# Patient Record
Sex: FEMALE | Race: WHITE | NOT HISPANIC OR LATINO | Employment: FULL TIME | URBAN - METROPOLITAN AREA
[De-identification: names, ages, dates, MRNs, and addresses within clinical notes are randomized per-mention and may not be internally consistent; named-entity substitution may affect disease eponyms.]

---

## 2017-01-10 ENCOUNTER — TELEPHONE (OUTPATIENT)
Dept: FAMILY MEDICINE | Facility: CLINIC | Age: 32
End: 2017-01-10

## 2017-01-10 NOTE — TELEPHONE ENCOUNTER
----- Message from Mariella Weber sent at 1/10/2017  8:48 AM CST -----  Contact: Self/[phone]   Patient is calling to speak with someone in the office regarding a personal and feminine issue. Please call and advise.    Thank you!

## 2017-01-26 ENCOUNTER — TELEPHONE (OUTPATIENT)
Dept: FAMILY MEDICINE | Facility: CLINIC | Age: 32
End: 2017-01-26

## 2017-01-26 NOTE — TELEPHONE ENCOUNTER
----- Message from Nimo Arreaga sent at 1/26/2017 12:09 PM CST -----  Contact: call  [phone]  Pt is calling to discuss a personal matter with doctor molly

## 2017-01-26 NOTE — TELEPHONE ENCOUNTER
Spoke with patient reports the following symptoms vaginal itching/discharge, foul vaginal odor, patient states the last time this happen which was in June Dr. Mendez ordered MetroGel.  Patient would like this medication sent to her pharmacy.  Declined offered appt.  Please advise.

## 2017-07-19 ENCOUNTER — HOSPITAL ENCOUNTER (OUTPATIENT)
Dept: RADIOLOGY | Facility: HOSPITAL | Age: 32
Discharge: HOME | End: 2017-07-19
Attending: FAMILY MEDICINE
Payer: COMMERCIAL

## 2017-07-19 ENCOUNTER — OFFICE VISIT (OUTPATIENT)
Dept: FAMILY MEDICINE | Facility: CLINIC | Age: 32
End: 2017-07-19
Payer: COMMERCIAL

## 2017-07-19 VITALS
WEIGHT: 112.44 LBS | DIASTOLIC BLOOD PRESSURE: 68 MMHG | HEIGHT: 64 IN | BODY MASS INDEX: 19.2 KG/M2 | TEMPERATURE: 99 F | SYSTOLIC BLOOD PRESSURE: 100 MMHG

## 2017-07-19 DIAGNOSIS — B96.89 BV (BACTERIAL VAGINOSIS): ICD-10-CM

## 2017-07-19 DIAGNOSIS — N76.0 VAGINOSIS: ICD-10-CM

## 2017-07-19 DIAGNOSIS — R22.9 LUMPS ON THE SKIN: ICD-10-CM

## 2017-07-19 DIAGNOSIS — T17.908A FOREIGN BODY ASPIRATION, INITIAL ENCOUNTER: ICD-10-CM

## 2017-07-19 DIAGNOSIS — N63.0 BREAST MASS IN FEMALE: ICD-10-CM

## 2017-07-19 DIAGNOSIS — T17.908A FOREIGN BODY ASPIRATION, INITIAL ENCOUNTER: Primary | ICD-10-CM

## 2017-07-19 DIAGNOSIS — N89.8 VAGINAL IRRITATION: ICD-10-CM

## 2017-07-19 DIAGNOSIS — N76.0 BV (BACTERIAL VAGINOSIS): ICD-10-CM

## 2017-07-19 DIAGNOSIS — L84 PRE-ULCERATIVE CORN OR CALLOUS: ICD-10-CM

## 2017-07-19 PROCEDURE — 73630 X-RAY EXAM OF FOOT: CPT | Mod: 26,RT,, | Performed by: RADIOLOGY

## 2017-07-19 PROCEDURE — 99999 PR PBB SHADOW E&M-EST. PATIENT-LVL III: CPT | Mod: PBBFAC,,, | Performed by: FAMILY MEDICINE

## 2017-07-19 PROCEDURE — 99214 OFFICE O/P EST MOD 30 MIN: CPT | Mod: 25,S$GLB,, | Performed by: FAMILY MEDICINE

## 2017-07-19 PROCEDURE — 28190 REMOVAL OF FOOT FOREIGN BODY: CPT | Mod: RT,S$GLB,, | Performed by: FAMILY MEDICINE

## 2017-07-19 PROCEDURE — 73630 X-RAY EXAM OF FOOT: CPT | Mod: TC,PO,RT

## 2017-07-19 RX ORDER — METRONIDAZOLE 7.5 MG/G
1 GEL VAGINAL 2 TIMES DAILY
Qty: 140 G | Refills: 3 | Status: SHIPPED | OUTPATIENT
Start: 2017-07-19 | End: 2017-11-22 | Stop reason: SDUPTHER

## 2017-07-19 NOTE — PROGRESS NOTES
Cass Merlos is a 31 y.o. female   She will complaining of a mass in the right breast, possible foreign body in the bottom of the foot, and recurrent bacterial vaginosis  Source of history: Patient  Past Medical History:   Diagnosis Date    Abnormal Pap smear     Allergy     Anxiety     GERD (gastroesophageal reflux disease)     Hemorrhoids without complication     Supraventricular tachycardia      Patient  reports that she has been smoking.  She has a 3.00 pack-year smoking history. She has never used smokeless tobacco. She reports that she drinks about 1.2 oz of alcohol per week . She reports that she does not use drugs.  Family History   Problem Relation Age of Onset    Arrhythmia Mother     Breast cancer Neg Hx     Cancer Neg Hx     Colon cancer Neg Hx     Diabetes Neg Hx     Eclampsia Neg Hx     Hypertension Neg Hx     Miscarriages / Stillbirths Neg Hx     Ovarian cancer Neg Hx      labor Neg Hx     Stroke Neg Hx      ROS:  GENERAL: No fever, chills, fatigability or weight loss.  SKIN: No rashes, itching or changes in color or texture of skin.  HEAD: No headaches or recent head trauma.  EYES: Visual acuity fine. No photophobia, ocular pain or diplopia.  EARS: Denies ear pain, discharge or vertigo.  NOSE: No loss of smell, no epistaxis or postnasal drip.  MOUTH & THROAT: No hoarseness or change in voice. No excessive gum bleeding.  NODES: Denies swollen glands.  CHEST: Denies GTZ, cyanosis, wheezing, cough and sputum production.  CARDIOVASCULAR: Denies chest pain, PND, orthopnea or reduced exercise tolerance.  ABDOMEN: Appetite fine. No weight loss. Denies diarrhea, abdominal pain, hematemesis or blood in stool.  URINARY:  complaining of foul-smelling vaginal discharge periodically /patient not sexually active presently   PERIPHERAL VASCULAR: No claudication or cyanosis.  MUSCULOSKELETAL: Foot pain possible foreign body  NEUROLOGIC: No history of seizures, paralysis, alteration  of gait or coordination.    OBJECTIVE:  APPEARANCE: Normal appearance  Vitals:    07/19/17 1057   BP: 100/68   Temp: 99.4 °F (37.4 °C)     SKIN: Normal skin turgor, no lesions.  Few benign appearing lesions on the upper back   HEENT: Both external auditory canals clear. Both tympanic membranes intact. PERRL. EOMI.   NECK: No bruits. No cervical spine tenderness. No cervical lymphadenopathy. No thyromegaly.  NODES: No cervical, axillary or inguinal lymph node enlargement.  CHEST: Breath sounds clear bilaterally. Lungs clear to auscultation & percussion. Good air movement. No rales. No retractions. No rhonchi. No stridor. No wheezes.  CARDIOVASCULAR: Normal S1, S2. No murmurs. No edema.  BREASTS: Patient has bilateral saline implants, on palpation of both breasts is a small hardened area lateral to the right breast implant  The opposite side is unremarkable and there are no skin changes over either breast area.  No masses palpated in either axilla  ABDOMEN: Bowel sounds normal. No palpable aortic enlargement. No CVA tenderness. No pulsatile mass. No rebound tenderness.  PERIPHERAL VASCULAR: Femoral pulses present and symmetrical. No edema.  MUSCULOSKELETAL: Hard and painful callus on the plantar surface of the right foot  BACK: No CVA tenderness. There is no spasm, tenderness or radiculopathy noted with palpation and there is full range of motion.   NEUROLOGIC:   Cranial Nerves: II-XII grossly intact.  Motor: 5/5 strength major flexors/extensors. No tremor.  DTR's: Knees, Ankles 2+ and equal bilaterally; downgoing toes.  Sensory: Intact to light touch distally.  Gait & Posture: Normal gait and fine motion. No cerebellar signs.  MENTAL STATUS: Alert. Oriented x 3. Language skills normal.  Well kept appearance.    ASSESSMENT/PLAN:   Cass was seen today for foot injury and foot pain.    Diagnoses and all orders for this visit:    Foreign body  initial encounter  -     X-Ray Foot Complete Right; Future  No foreign  bodies were identified on the film results discussed with the patient  Vaginosis  -     metronidazole (METROGEL) 0.75 % vaginal gel; Place 1 applicator vaginally 2 (two) times daily.  Painful callus on the plantar surface of the foot  -     Patient's permission the area cleaned and pared to patient's comfort  Patient tolerated procedure without any difficulty there was no blood loss  Patient had remarkable relief of discomfort after paring of the callus.  Lumps on the breast  -     US Breast Right Limited; Future  We will contact patient with results of testing are available

## 2017-10-24 ENCOUNTER — TELEPHONE (OUTPATIENT)
Dept: FAMILY MEDICINE | Facility: CLINIC | Age: 32
End: 2017-10-24

## 2017-10-24 NOTE — TELEPHONE ENCOUNTER
----- Message from Yanna Ferreira sent at 10/24/2017 11:07 AM CDT -----  Contact: self/ [phone]  Please call patient about her 7/19/17 visit. Billing issue.

## 2017-10-24 NOTE — TELEPHONE ENCOUNTER
Spoke with patient states she came in on 7/19/17 with possible glass noted to her foot.  Patient states she had x-ray done which showed no glass but Dr. Mendez did remove/shave some skin off the bottom of her foot that did relieve the pain she had.  Patient states she received a call from her insurance company stating that the procedure that you performed on her foot was denied and they advised her that it was a cosmetic issue and not medically related patient would like you to reword the procedure that was done and resubmit it to her insurance since they are charging her $300

## 2017-10-26 NOTE — TELEPHONE ENCOUNTER
Attempted to contact patient voicemail left advising her that the office visit was recoded and to contact our billing department (phone number provided) in a few days concerning the claim

## 2017-11-22 DIAGNOSIS — N76.0 VAGINOSIS: ICD-10-CM

## 2017-11-22 DIAGNOSIS — N89.8 VAGINAL IRRITATION: ICD-10-CM

## 2017-11-22 RX ORDER — METRONIDAZOLE 7.5 MG/G
GEL VAGINAL
Qty: 140 G | Refills: 2 | Status: SHIPPED | OUTPATIENT
Start: 2017-11-22 | End: 2020-05-05

## 2019-12-31 ENCOUNTER — TELEPHONE (OUTPATIENT)
Dept: OBSTETRICS AND GYNECOLOGY | Facility: CLINIC | Age: 34
End: 2019-12-31

## 2019-12-31 ENCOUNTER — TELEPHONE (OUTPATIENT)
Dept: PRIMARY CARE CLINIC | Facility: CLINIC | Age: 34
End: 2019-12-31

## 2019-12-31 ENCOUNTER — OFFICE VISIT (OUTPATIENT)
Dept: PRIMARY CARE CLINIC | Facility: CLINIC | Age: 34
End: 2019-12-31
Payer: COMMERCIAL

## 2019-12-31 ENCOUNTER — HOSPITAL ENCOUNTER (OUTPATIENT)
Dept: RADIOLOGY | Facility: OTHER | Age: 34
Discharge: HOME | End: 2019-12-31
Attending: FAMILY MEDICINE
Payer: COMMERCIAL

## 2019-12-31 VITALS
BODY MASS INDEX: 18.93 KG/M2 | HEART RATE: 104 BPM | HEIGHT: 64 IN | DIASTOLIC BLOOD PRESSURE: 80 MMHG | WEIGHT: 110.88 LBS | TEMPERATURE: 99 F | SYSTOLIC BLOOD PRESSURE: 106 MMHG

## 2019-12-31 DIAGNOSIS — Z34.90 PREGNANCY, UNSPECIFIED GESTATIONAL AGE: ICD-10-CM

## 2019-12-31 DIAGNOSIS — N92.6 MENSTRUAL DISORDER: Primary | ICD-10-CM

## 2019-12-31 LAB
B-HCG UR QL: POSITIVE
CTP QC/QA: YES

## 2019-12-31 PROCEDURE — 76817 TRANSVAGINAL US OBSTETRIC: CPT | Mod: 26,,, | Performed by: RADIOLOGY

## 2019-12-31 PROCEDURE — 76801 OB US < 14 WKS SINGLE FETUS: CPT | Mod: TC

## 2019-12-31 PROCEDURE — 76801 OB US < 14 WKS SINGLE FETUS: CPT | Mod: 26,,, | Performed by: RADIOLOGY

## 2019-12-31 PROCEDURE — 3008F BODY MASS INDEX DOCD: CPT | Mod: CPTII,S$GLB,, | Performed by: FAMILY MEDICINE

## 2019-12-31 PROCEDURE — 76801 US OB LESS THAN 14 WKS WITH TRANSVAGINAL (XPD): ICD-10-PCS | Mod: 26,,, | Performed by: RADIOLOGY

## 2019-12-31 PROCEDURE — 99999 PR PBB SHADOW E&M-EST. PATIENT-LVL IV: ICD-10-PCS | Mod: PBBFAC,,, | Performed by: FAMILY MEDICINE

## 2019-12-31 PROCEDURE — 81025 URINE PREGNANCY TEST: CPT | Mod: S$GLB,,, | Performed by: FAMILY MEDICINE

## 2019-12-31 PROCEDURE — 81025 POCT URINE PREGNANCY: ICD-10-PCS | Mod: S$GLB,,, | Performed by: FAMILY MEDICINE

## 2019-12-31 PROCEDURE — 76817 US OB LESS THAN 14 WKS WITH TRANSVAGINAL (XPD): ICD-10-PCS | Mod: 26,,, | Performed by: RADIOLOGY

## 2019-12-31 PROCEDURE — 99214 PR OFFICE/OUTPT VISIT, EST, LEVL IV, 30-39 MIN: ICD-10-PCS | Mod: S$GLB,,, | Performed by: FAMILY MEDICINE

## 2019-12-31 PROCEDURE — 3008F PR BODY MASS INDEX (BMI) DOCUMENTED: ICD-10-PCS | Mod: CPTII,S$GLB,, | Performed by: FAMILY MEDICINE

## 2019-12-31 PROCEDURE — 99214 OFFICE O/P EST MOD 30 MIN: CPT | Mod: S$GLB,,, | Performed by: FAMILY MEDICINE

## 2019-12-31 PROCEDURE — 99999 PR PBB SHADOW E&M-EST. PATIENT-LVL IV: CPT | Mod: PBBFAC,,, | Performed by: FAMILY MEDICINE

## 2019-12-31 RX ORDER — SWAB
1 SWAB, NON-MEDICATED MISCELLANEOUS DAILY
Qty: 30 TABLET | Refills: 12 | COMMUNITY
Start: 2019-12-31 | End: 2020-12-17

## 2019-12-31 NOTE — TELEPHONE ENCOUNTER
----- Message from Gisela Babin sent at 12/31/2019  3:03 PM CST -----  Contact: [phone]  Patient would like a call from nurse regarding scheduling a sooner appointment. Please call patient.

## 2019-12-31 NOTE — TELEPHONE ENCOUNTER
----- Message from Rosie Oconnell sent at 12/31/2019  3:39 PM CST -----  Contact: Self Call  [phone]  Please call her in ref to the Us she is to take

## 2019-12-31 NOTE — TELEPHONE ENCOUNTER
----- Message from Margarita Harkins sent at 12/31/2019  4:37 PM CST -----  Contact: self   Patient called to see if the ultra sound came back ok with the pregnancy. Please call and advise.

## 2019-12-31 NOTE — TELEPHONE ENCOUNTER
Discussed with Dr. Oates.  Pt advised to keep today's appt to confirm pregnancy.  Pt requesting ultrasound to rule out tubal pregnancy.

## 2019-12-31 NOTE — TELEPHONE ENCOUNTER
Patient informed pending u/s results,  Will know when to  schedule appointment, will contact Thursday to set up appointment.

## 2019-12-31 NOTE — TELEPHONE ENCOUNTER
----- Message from Ricardo Maguire sent at 12/31/2019  8:00 AM CST -----  Contact: Patient [phone]  Patient would like to get medical advice.    Comments: Patient stating would like for the pcp to call back this morning if possible. Has an appt today at 1:40 , think is pregnant.    Please call an advise  Thank you

## 2019-12-31 NOTE — TELEPHONE ENCOUNTER
Pt advised US results not yet received.  Pt now active [facility].  Msg will be sent as soon as results rec'd.

## 2019-12-31 NOTE — PATIENT INSTRUCTIONS
Pregnancy After Age 35  Its a myth that being 35 or older means your pregnancy will be high risk. Making the right choices now and working with your healthcare provider can help make your pregnancy trouble-free.  Things to think about  Most women who are 35 or older have normal pregnancies, but there are some things to think about before getting pregnant. Once a woman reaches 35, she has a greater chance for:  · Problems getting pregnant  · Miscarriage  · Stillbirth  · Diabetes or high blood pressure while pregnant  · Being tired all the time when pregnant  ·  section (surgery to deliver a baby)  · Having babies with genetic problems, like Down syndrome  · Being pregnant with 2 or more babies  Making the right choices  Before and after you become pregnant:  · Dont use recreational drugs.  · Dont drink alcohol.  · Dont smoke.  Keeping you and your baby healthy  Before and during your pregnancy:  · Take a daily vitamin supplement that contains folic acid and iron.  · Eat a high-fiber, low-fat diet rich in fruits and vegetables.  · Stay physically active.  · Keep a healthy weight.  · Avoid contact with harmful substances in your home or workplace.  You may need extra care if you have any of the following:  · Sexually transmitted diseases (STDs)  · Diabetes  · High blood pressure  · Other chronic health problems  Special health care  Fertility counseling. As women age, getting pregnant can get more difficult. Ask your healthcare provider how long you should try to get pregnant before seeking help from a specialist.  Genetic counseling. Genetic counseling evaluates the risk for birth defects in your baby. You will be asked detailed questions about your family health history. You may also have medical tests.  Amniocentesis. This test studies amniotic fluid (liquid that surrounds the fetus in the womb). It can help diagnose birth defects and other medical problems.   Date Last Reviewed: 2015  © 2669-6905  [employer]. [address] [place] [address], [place] [address]. All rights reserved. This information is not intended as a substitute for professional medical care. Always follow your healthcare professional's instructions.        Exercise During Pregnancy  Regular exercise can help you adapt to the changes your body is going through during pregnancy. Exercising may help you relax, and it gets you ready for labor and delivery. Talk to your healthcare provider about the kinds of activities you can do. Then go ahead and enjoy them.    Get started  Even if you didnt exercise before pregnancy, it is not too late to start. Choose an activity that you like and that fits your lifestyle. Begin slowly and build up a little at a time. Be sure to check with your healthcare provider before starting any exercise program. The following tips may help you get started:  · Choose a time and place to exercise each day.  · Wear loose-fitting clothes and comfortable athletic shoes.  · Stretch before and after you exercise. (Be sure to stretch slowly and to hold stretches for 30 to 40 seconds.)  Be active  Unless your healthcare provider says otherwise, try to exercise for 30 minutes or more most days of the week:  · Overall conditioning, like swimming, bicycling, or walking, is especially beneficial.  · Aerobics and exercises that increase your pulse rate help condition your body and strengthen your heart. Ask about special prenatal aerobics classes.  Exercise safely  These tips will help you have a safe, healthy workout:  · Stay cool. Stop exercising if you feel overheated.  · Slow down if youre out of breath. If you cant talk during exercise, lower the intensity of the workout.  · Monitor the intensity of your workout. Only do moderate-intensity (not strenuous) exercise.  · Stay off your back. Lying on your back can decrease blood flow to your baby.  · Drink water before, during and after your workout.  · Eat 300 extra  calories a day. A light snack before and after you exercise will help keep your energy up.  · Avoid activities requiring balancing skills later in pregnancy.  Do Kegel exercises  Kegel exercises strengthen the pelvic floor muscles used in childbirth. These muscles are the same ones used to stop the flow of urine. Do Kegel exercises daily:  · Squeeze your pelvic floor muscles for a count of 3.  · Relax, then squeeze again.  · Repeat 10-15 times in a row at least 3 times a day.  · You can do Kegel exercises anytime and anywhere.  Keep walking  No matter what other exercise you do, try to walk whenever you can:  · If youre working all day, take a lunchtime walk in the park with a friend.  · When you shop, park away from the store entrance and walk the extra distance.  · Take the stairs instead of the elevator.     When to stop exercising and call your healthcare provider  Call your healthcare provider right away if you have:  · Shortness of breath before starting exercise  · Vaginal bleeding  · Dizziness or feeling faint  · Chest pain  · Headache  · Decreased fetal movement  ·  contractions   · Muscle weakness  · Calf pain or swelling  · Fluid leaking from the vagina   Date Last Reviewed: 2015  © 3014-1106 [employer]. [address] [place] [address], [place] [address]. All rights reserved. This information is not intended as a substitute for professional medical care. Always follow your healthcare professional's instructions.        Healthy Eating Habits During Pregnancy    Its important to develop healthy eating habits while you are pregnant, for you as well as for your baby. Here are some ways to stay healthy.  Aim for a healthy weight  A slow, steady rate of weight gain is often best. After the first trimester, you may gain about a pound a week. If you were overweight before pregnancy, you need to gain fewer pounds. Your healthcare provider can give you a healthy weight goal for your  pregnancy.  Dont diet  Now is not the time to diet. You may not get enough of the nutrients you and your baby need. Instead, learn how to be a healthy eater. Start by doing it for your baby. Soon, you may do it for yourself.  Vitamins and supplements  Talk with your healthcare provider about taking these and other prenatal vitamins and supplements.  · Iron makes the extra blood you need now.  · Calcium and vitamin D help build and keep strong bones.  · Folic acid helps prevent certain birth defects.  · Some vitamins may not be safe to take. Your healthcare provider will tell you which ones to avoid.  Fluids  Drink at least 8 to 10 cups of fluid daily. Your baby needs fluids. Fluids also decrease constipation, flush out toxins and waste, limit swelling, and help prevent bladder infections. Water is best. Other good choices are:  · Water or seltzer water with a slice of lemon or lime. (These can also help ease an upset stomach.)  · Clear soups that are low in salt  · Low-fat or fat-free milk; soy or rice milk with calcium added  · 100% fruit juices mixed with water  · Popsicles or gelatin  Things to avoid  Some things might harm your growing baby. Dont eat or drink:  · Alcohol  · Unpasteurized dairy foods and juices  · Raw or undercooked meat, poultry, fish, or eggs  · Prepared meats, like deli meats or hot dogs, unless heated until steaming hot  · Fish that are high in mercury, like shark, swordfish, gregory mackerel, tilefish, and albacore tuna   Things to limit  Ask your healthcare provider whether its safe to eat or drink:  · Caffeine  · Artificial sweeteners  · Organ meats  · Certain types of fish  · Fish and shellfish that contain mercury in lower amounts, like shrimp, canned light tuna, salmon, pollock, and catfish   Date Last Reviewed: 8/16/2015  © 4049-4684 [employer]. [address] [place] [address], [place] [address]. All rights reserved. This information is not intended as a substitute for  professional medical care. Always follow your healthcare professional's instructions.

## 2019-12-31 NOTE — TELEPHONE ENCOUNTER
----- Message from Gisela Babin sent at 12/31/2019  3:03 PM CST -----  Contact: [phone]  Patient would like a call from nurse regarding scheduling a sooner appointment. Please call patient.

## 2020-01-01 ENCOUNTER — HOSPITAL ENCOUNTER (EMERGENCY)
Facility: OTHER | Age: 35
Discharge: HOME | End: 2020-01-01
Attending: EMERGENCY MEDICINE
Payer: COMMERCIAL

## 2020-01-01 VITALS
SYSTOLIC BLOOD PRESSURE: 105 MMHG | OXYGEN SATURATION: 100 % | HEART RATE: 92 BPM | RESPIRATION RATE: 18 BRPM | HEIGHT: 64 IN | DIASTOLIC BLOOD PRESSURE: 62 MMHG | BODY MASS INDEX: 18.78 KG/M2 | WEIGHT: 110 LBS

## 2020-01-01 DIAGNOSIS — O36.80X0 PREGNANCY OF UNKNOWN ANATOMIC LOCATION: Primary | ICD-10-CM

## 2020-01-01 DIAGNOSIS — N83.9 LESION OF OVARY: ICD-10-CM

## 2020-01-01 LAB
ALBUMIN SERPL BCP-MCNC: 3.9 G/DL (ref 3.5–5.2)
ALP SERPL-CCNC: 63 U/L (ref 55–135)
ALT SERPL W/O P-5'-P-CCNC: 15 U/L (ref 10–44)
ANION GAP SERPL CALC-SCNC: 9 MMOL/L (ref 8–16)
AST SERPL-CCNC: 13 U/L (ref 10–40)
BACTERIA #/AREA URNS HPF: ABNORMAL /HPF
BASOPHILS # BLD AUTO: 0.04 K/UL (ref 0–0.2)
BASOPHILS NFR BLD: 0.5 % (ref 0–1.9)
BILIRUB SERPL-MCNC: 0.2 MG/DL (ref 0.1–1)
BILIRUB UR QL STRIP: NEGATIVE
BUN SERPL-MCNC: 11 MG/DL (ref 6–20)
CALCIUM SERPL-MCNC: 9.5 MG/DL (ref 8.7–10.5)
CHLORIDE SERPL-SCNC: 109 MMOL/L (ref 95–110)
CLARITY UR: ABNORMAL
CO2 SERPL-SCNC: 22 MMOL/L (ref 23–29)
COLOR UR: YELLOW
CREAT SERPL-MCNC: 0.7 MG/DL (ref 0.5–1.4)
DIFFERENTIAL METHOD: ABNORMAL
EOSINOPHIL # BLD AUTO: 0.1 K/UL (ref 0–0.5)
EOSINOPHIL NFR BLD: 1.6 % (ref 0–8)
ERYTHROCYTE [DISTWIDTH] IN BLOOD BY AUTOMATED COUNT: 12.5 % (ref 11.5–14.5)
EST. GFR  (AFRICAN AMERICAN): >60 ML/MIN/1.73 M^2
EST. GFR  (NON AFRICAN AMERICAN): >60 ML/MIN/1.73 M^2
GLUCOSE SERPL-MCNC: 83 MG/DL (ref 70–110)
GLUCOSE UR QL STRIP: NEGATIVE
HCG INTACT+B SERPL-ACNC: 31 MIU/ML
HCT VFR BLD AUTO: 41.6 % (ref 37–48.5)
HGB BLD-MCNC: 13.8 G/DL (ref 12–16)
HGB UR QL STRIP: ABNORMAL
IMM GRANULOCYTES # BLD AUTO: 0.02 K/UL (ref 0–0.04)
IMM GRANULOCYTES NFR BLD AUTO: 0.3 % (ref 0–0.5)
KETONES UR QL STRIP: NEGATIVE
LEUKOCYTE ESTERASE UR QL STRIP: NEGATIVE
LYMPHOCYTES # BLD AUTO: 2.1 K/UL (ref 1–4.8)
LYMPHOCYTES NFR BLD: 28.4 % (ref 18–48)
MCH RBC QN AUTO: 31.8 PG (ref 27–31)
MCHC RBC AUTO-ENTMCNC: 33.2 G/DL (ref 32–36)
MCV RBC AUTO: 96 FL (ref 82–98)
MICROSCOPIC COMMENT: ABNORMAL
MONOCYTES # BLD AUTO: 0.7 K/UL (ref 0.3–1)
MONOCYTES NFR BLD: 9.4 % (ref 4–15)
NEUTROPHILS # BLD AUTO: 4.5 K/UL (ref 1.8–7.7)
NEUTROPHILS NFR BLD: 59.8 % (ref 38–73)
NITRITE UR QL STRIP: NEGATIVE
NRBC BLD-RTO: 0 /100 WBC
PH UR STRIP: 6 [PH] (ref 5–8)
PLATELET # BLD AUTO: 337 K/UL (ref 150–350)
PMV BLD AUTO: 9.2 FL (ref 9.2–12.9)
POTASSIUM SERPL-SCNC: 4.3 MMOL/L (ref 3.5–5.1)
PROT SERPL-MCNC: 7.3 G/DL (ref 6–8.4)
PROT UR QL STRIP: NEGATIVE
RBC # BLD AUTO: 4.34 M/UL (ref 4–5.4)
RBC #/AREA URNS HPF: 8 /HPF (ref 0–4)
SODIUM SERPL-SCNC: 140 MMOL/L (ref 136–145)
SP GR UR STRIP: >=1.03 (ref 1–1.03)
SQUAMOUS #/AREA URNS HPF: 11 /HPF
URN SPEC COLLECT METH UR: ABNORMAL
UROBILINOGEN UR STRIP-ACNC: NEGATIVE EU/DL
WBC # BLD AUTO: 7.54 K/UL (ref 3.9–12.7)

## 2020-01-01 PROCEDURE — 84702 CHORIONIC GONADOTROPIN TEST: CPT

## 2020-01-01 PROCEDURE — 99283 EMERGENCY DEPT VISIT LOW MDM: CPT | Mod: 25

## 2020-01-01 PROCEDURE — 80053 COMPREHEN METABOLIC PANEL: CPT

## 2020-01-01 PROCEDURE — 81000 URINALYSIS NONAUTO W/SCOPE: CPT

## 2020-01-01 PROCEDURE — 36000 PLACE NEEDLE IN VEIN: CPT

## 2020-01-01 PROCEDURE — 85025 COMPLETE CBC W/AUTO DIFF WBC: CPT

## 2020-01-01 NOTE — ED TRIAGE NOTES
Pt reports positive home pregnancy test, verified w/ PCP, had ultrasound done last night. Received phone call telling patient to come to ED for further evaluation of ultrasound. Pt deniesa ny pain, denies vaginal discharge, is AAO x 3, answers questions appropriately

## 2020-01-01 NOTE — PROGRESS NOTES
Subjective:       Patient ID: Cass Merlos is a 34 y.o. female.    Chief Complaint: home pregnancy testing showed positive reading   Patient denies use of any birth control she is not in any pain  He has not had a menstrual period for 3 months now  HPI see above  Review of Systems   Constitutional: Negative.    HENT: Negative.    Eyes: Negative.    Respiratory: Negative.    Endocrine: Negative.    Genitourinary:        A menorrhea for 3 months   Musculoskeletal: Negative.    Skin: Negative.    Allergic/Immunologic: Negative.    Neurological: Negative.    Hematological: Negative.    Psychiatric/Behavioral: Negative.        Objective:      Physical Exam   Constitutional: She is oriented to person, place, and time. She appears well-developed and well-nourished. No distress.   HENT:   Head: Normocephalic and atraumatic.   Right Ear: External ear normal.   Left Ear: External ear normal.   Nose: Nose normal.   Mouth/Throat: Oropharynx is clear and moist.   Eyes: Pupils are equal, round, and reactive to light. Conjunctivae and EOM are normal.   Neck: Normal range of motion. Neck supple. No JVD present.   Cardiovascular: Normal rate and regular rhythm.   Pulmonary/Chest: Effort normal and breath sounds normal.   Abdominal: Soft. Bowel sounds are normal.   Musculoskeletal: Normal range of motion.   Neurological: She is alert and oriented to person, place, and time. She displays normal reflexes. No cranial nerve deficit or sensory deficit. She exhibits normal muscle tone. Coordination normal.   Skin: Skin is warm and dry. Capillary refill takes less than 2 seconds. No rash noted. She is not diaphoretic. No erythema. No pallor.   Psychiatric: She has a normal mood and affect. Her behavior is normal. Judgment and thought content normal.   Nursing note and vitals reviewed.      Assessment:       1. Menstrual disorder    2. Pregnancy, unspecified gestational age        Plan:     Cass was seen today for home pregnancy  testing showed positive reading.    Diagnoses and all orders for this visit:    Menstrual disorder  -     POCT URINE PREGNANCY-very weakly positive x2  -     hCG, quantitative; Future  -     CBC auto differential; Future  -     TSH; Future  -     Comprehensive metabolic panel; Future  -     Lipid panel; Future  -     Follicle stimulating hormone; Future  -     US Pelvis Limited Non OB; Future    Pregnancy, unspecified gestational age  -     US OB Less Than 14 Wks Add Gestation; patient is scheduled for today  -     prenatal vit-iron fum-folic ac (PRENATAL TABLET) 28 mg iron- 800 mcg Tab; Take 1 tablet by mouth once daily.  -     Ambulatory consult to Obstetrics / Gynecology  Will contact the patient with results of testing when available

## 2020-01-01 NOTE — ED PROVIDER NOTES
Encounter Date: 2020    SCRIBE #1 NOTE: I, Shoshana Fernandes, am scribing for, and in the presence of,  Dr. Flannery. I have scribed the following portions of the note - Other sections scribed: ED Course updates.       History     Chief Complaint   Patient presents with    abnormal ultrasound     pt had u/s yesterday and refered here.      Patient is a 34-year-old female who presents to the emergency room with complaint of being sent to the emergency department for pregnancy with recent abnormal ultrasound.  Patient denies any nausea, vomiting, fever, chills, shortness of breath, abdominal pain or pelvic cramping.  No vaginal bleeding.  There are no other complaints.        Review of patient's allergies indicates:   Allergen Reactions    Vicodin [hydrocodone-acetaminophen]      ITCHING     Past Medical History:   Diagnosis Date    Abnormal Pap smear     Allergy     Anxiety     GERD (gastroesophageal reflux disease)     Hemorrhoids without complication     Supraventricular tachycardia      Past Surgical History:   Procedure Laterality Date    CERVICAL BIOPSY  W/ LOOP ELECTRODE EXCISION      VAGINAL DELIVERY       Family History   Problem Relation Age of Onset    Arrhythmia Mother     Breast cancer Neg Hx     Cancer Neg Hx     Colon cancer Neg Hx     Diabetes Neg Hx     Eclampsia Neg Hx     Hypertension Neg Hx     Miscarriages / Stillbirths Neg Hx     Ovarian cancer Neg Hx      labor Neg Hx     Stroke Neg Hx      Social History     Tobacco Use    Smoking status: Current Some Day Smoker     Packs/day: 0.50     Years: 6.00     Pack years: 3.00    Smokeless tobacco: Never Used   Substance Use Topics    Alcohol use: Yes     Alcohol/week: 2.0 standard drinks     Types: 2 Glasses of wine per week    Drug use: No     Review of Systems   Constitutional: Negative for fever.   HENT: Negative for sore throat.    Respiratory: Negative for shortness of breath.    Cardiovascular: Negative for chest  pain.   Gastrointestinal: Negative for abdominal pain and nausea.   Genitourinary: Negative for dysuria, flank pain, frequency, hematuria, vaginal bleeding and vaginal discharge.   Musculoskeletal: Negative for back pain.   Skin: Negative for rash.   Neurological: Negative for weakness.   Hematological: Does not bruise/bleed easily.       Physical Exam     Initial Vitals [01/01/20 1205]   BP Pulse Resp Temp SpO2   108/70 89 18 -- 100 %      MAP       --         Physical Exam    Nursing note and vitals reviewed.  Constitutional: She appears well-developed and well-nourished. She is not diaphoretic. No distress.   HENT:   Head: Normocephalic and atraumatic.   Mouth/Throat: Oropharynx is clear and moist and mucous membranes are normal.   Mucous membranes are moist. TMs clear and intact bilaterally.    Eyes: Conjunctivae and EOM are normal. Pupils are equal, round, and reactive to light. No scleral icterus.   Conjunctivae are pink, clear, and intact.    Neck: Normal range of motion. Neck supple.   Cardiovascular: Normal rate, regular rhythm, S1 normal, S2 normal and normal heart sounds. Exam reveals no gallop and no friction rub.    No murmur heard.  Pulmonary/Chest: Breath sounds normal. No respiratory distress. She has no wheezes. She has no rhonchi. She has no rales.   Lungs clear to auscultation bilaterally.    Abdominal: Soft. Bowel sounds are normal. There is no tenderness. There is no rebound and no guarding.   No audible bruits.    Musculoskeletal: Normal range of motion. She exhibits no edema or tenderness.   No lower extremity edema.    Lymphadenopathy:     She has no cervical adenopathy.   Neurological: She is alert and oriented to person, place, and time.   Skin: Skin is warm and dry. Capillary refill takes less than 2 seconds. No rash noted. No pallor.   No skin tenting. No lesions.   Psychiatric: She has a normal mood and affect. Her behavior is normal. Judgment and thought content normal.         ED  Course   Procedures  Labs Reviewed   CBC W/ AUTO DIFFERENTIAL - Abnormal; Notable for the following components:       Result Value    Mean Corpuscular Hemoglobin 31.8 (*)     All other components within normal limits   COMPREHENSIVE METABOLIC PANEL - Abnormal; Notable for the following components:    CO2 22 (*)     All other components within normal limits   URINALYSIS, REFLEX TO URINE CULTURE - Abnormal; Notable for the following components:    Appearance, UA Hazy (*)     Specific Gravity, UA >=1.030 (*)     Occult Blood UA 2+ (*)     All other components within normal limits    Narrative:     Preferred Collection Type->Urine, Clean Catch   URINALYSIS MICROSCOPIC - Abnormal; Notable for the following components:    RBC, UA 8 (*)     All other components within normal limits    Narrative:     Preferred Collection Type->Urine, Clean Catch   HCG, QUANTITATIVE, PREGNANCY          Imaging Results    None          Medical Decision Making:   History:   Old Medical Records: I decided to obtain old medical records.  Clinical Tests:   Lab Tests: Ordered and Reviewed            Scribe Attestation:   Scribe #1: I performed the above scribed service and the documentation accurately describes the services I performed. I attest to the accuracy of the note.    Attending Attestation:           Physician Attestation for Scribe:  Physician Attestation Statement for Scribe #1: I, Dr. Flannery, reviewed documentation, as scribed by Shoshana Fernandes in my presence, and it is both accurate and complete.         Attending ED Notes:   Emergent evaluation a 34-year-old female, G2, P1 without prenatal care presents to the emergency room with abnormal ultrasound that was suspicious for possible ectopic pregnancy.  Patient is afebrile, nontoxic appearing with stable vital signs.  Patient has no elevation white blood cell count.  H&H within normal limits. No acute findings on CMP.  Beta HCG is 31.  No acute findings in urinary analysis.  Ultrasound  was reviewed by Radiology, and OB.  Dr. Zuniga came to the patient's bedside for evaluation.  The patient is cleared for discharge. The patient is extensively counseled her diagnosis and treatment including all laboratory and physical exam findings.  The patient is discharged in good condition and directed to follow up with OB this coming Monday.          ED Course as of Jan 01 1848 Wed Jan 01, 2020   1256 Paged GYN    [LT]   1336 Discussed case with GYN services who will review the imaging and call back.    [LT]   1349 Patient tearful, requesting to speak with GYN services. GYN notified. Dr. Zuniga of GYN, will come to assess the patient.    [LT]      ED Course User Index  [LT] Shoshana Fernandes                Clinical Impression:     1. Pregnancy of unknown anatomic location    2. Lesion of ovary                              Chong Do MD  01/01/20 1848

## 2020-01-02 ENCOUNTER — TELEPHONE (OUTPATIENT)
Dept: OBSTETRICS AND GYNECOLOGY | Facility: CLINIC | Age: 35
End: 2020-01-02

## 2020-01-02 ENCOUNTER — DOCUMENTATION ONLY (OUTPATIENT)
Dept: PRIMARY CARE CLINIC | Facility: CLINIC | Age: 35
End: 2020-01-02

## 2020-01-02 NOTE — TELEPHONE ENCOUNTER
Dr. Roche has reviewed patients u/s and lab, pt to be contacted to ensure repeat HCG 1/3/2020. Lm on vm to cb

## 2020-01-02 NOTE — PROGRESS NOTES
Patient Review of Clinical Information     Problems   This clinical information was not verified, but there are updates pending review:   No Longer Applicable Problems Start Date Reported By  Comments   Exposure to STD 4/23/2015 Cass Merlos    Vaginosis 7/19/2017 Cass Merlos    Vaginitis 10/29/2014 Cass Merlos    Medications   This clinical information was not verified.   Allergies   This clinical information was not verified.

## 2020-01-03 ENCOUNTER — TELEPHONE (OUTPATIENT)
Dept: PRIMARY CARE CLINIC | Facility: CLINIC | Age: 35
End: 2020-01-03

## 2020-01-03 ENCOUNTER — LAB VISIT (OUTPATIENT)
Dept: LAB | Facility: OTHER | Age: 35
End: 2020-01-03
Attending: STUDENT IN AN ORGANIZED HEALTH CARE EDUCATION/TRAINING PROGRAM
Payer: COMMERCIAL

## 2020-01-03 DIAGNOSIS — O36.80X0 PREGNANCY OF UNKNOWN ANATOMIC LOCATION: ICD-10-CM

## 2020-01-03 DIAGNOSIS — N92.6 MENSTRUAL DISORDER: ICD-10-CM

## 2020-01-03 LAB
ALBUMIN SERPL BCP-MCNC: 4.2 G/DL (ref 3.5–5.2)
ALP SERPL-CCNC: 60 U/L (ref 55–135)
ALT SERPL W/O P-5'-P-CCNC: 14 U/L (ref 10–44)
ANION GAP SERPL CALC-SCNC: 9 MMOL/L (ref 8–16)
AST SERPL-CCNC: 14 U/L (ref 10–40)
BASOPHILS # BLD AUTO: 0.06 K/UL (ref 0–0.2)
BASOPHILS NFR BLD: 0.7 % (ref 0–1.9)
BILIRUB SERPL-MCNC: 0.2 MG/DL (ref 0.1–1)
BUN SERPL-MCNC: 9 MG/DL (ref 6–20)
CALCIUM SERPL-MCNC: 9.9 MG/DL (ref 8.7–10.5)
CHLORIDE SERPL-SCNC: 106 MMOL/L (ref 95–110)
CHOLEST SERPL-MCNC: 173 MG/DL (ref 120–199)
CHOLEST/HDLC SERPL: 2.6 {RATIO} (ref 2–5)
CO2 SERPL-SCNC: 24 MMOL/L (ref 23–29)
CREAT SERPL-MCNC: 0.7 MG/DL (ref 0.5–1.4)
DIFFERENTIAL METHOD: ABNORMAL
EOSINOPHIL # BLD AUTO: 0.1 K/UL (ref 0–0.5)
EOSINOPHIL NFR BLD: 1.4 % (ref 0–8)
ERYTHROCYTE [DISTWIDTH] IN BLOOD BY AUTOMATED COUNT: 12.6 % (ref 11.5–14.5)
EST. GFR  (AFRICAN AMERICAN): >60 ML/MIN/1.73 M^2
EST. GFR  (NON AFRICAN AMERICAN): >60 ML/MIN/1.73 M^2
FSH SERPL-ACNC: 1.4 MIU/ML
GLUCOSE SERPL-MCNC: 76 MG/DL (ref 70–110)
HCG INTACT+B SERPL-ACNC: 58 MIU/ML
HCT VFR BLD AUTO: 44.3 % (ref 37–48.5)
HDLC SERPL-MCNC: 66 MG/DL (ref 40–75)
HDLC SERPL: 38.2 % (ref 20–50)
HGB BLD-MCNC: 14.3 G/DL (ref 12–16)
IMM GRANULOCYTES # BLD AUTO: 0.01 K/UL (ref 0–0.04)
IMM GRANULOCYTES NFR BLD AUTO: 0.1 % (ref 0–0.5)
LDLC SERPL CALC-MCNC: 95.6 MG/DL (ref 63–159)
LYMPHOCYTES # BLD AUTO: 2.5 K/UL (ref 1–4.8)
LYMPHOCYTES NFR BLD: 30.3 % (ref 18–48)
MCH RBC QN AUTO: 31.5 PG (ref 27–31)
MCHC RBC AUTO-ENTMCNC: 32.3 G/DL (ref 32–36)
MCV RBC AUTO: 98 FL (ref 82–98)
MONOCYTES # BLD AUTO: 0.7 K/UL (ref 0.3–1)
MONOCYTES NFR BLD: 8.2 % (ref 4–15)
NEUTROPHILS # BLD AUTO: 4.9 K/UL (ref 1.8–7.7)
NEUTROPHILS NFR BLD: 59.3 % (ref 38–73)
NONHDLC SERPL-MCNC: 107 MG/DL
NRBC BLD-RTO: 0 /100 WBC
PLATELET # BLD AUTO: 365 K/UL (ref 150–350)
PMV BLD AUTO: 9.9 FL (ref 9.2–12.9)
POTASSIUM SERPL-SCNC: 4 MMOL/L (ref 3.5–5.1)
PROT SERPL-MCNC: 7.8 G/DL (ref 6–8.4)
RBC # BLD AUTO: 4.54 M/UL (ref 4–5.4)
SODIUM SERPL-SCNC: 139 MMOL/L (ref 136–145)
TRIGL SERPL-MCNC: 57 MG/DL (ref 30–150)
TSH SERPL DL<=0.005 MIU/L-ACNC: 1.2 UIU/ML (ref 0.4–4)
WBC # BLD AUTO: 8.28 K/UL (ref 3.9–12.7)

## 2020-01-03 PROCEDURE — 85025 COMPLETE CBC W/AUTO DIFF WBC: CPT

## 2020-01-03 PROCEDURE — 80053 COMPREHEN METABOLIC PANEL: CPT

## 2020-01-03 PROCEDURE — 36415 COLL VENOUS BLD VENIPUNCTURE: CPT

## 2020-01-03 PROCEDURE — 84702 CHORIONIC GONADOTROPIN TEST: CPT

## 2020-01-03 PROCEDURE — 80061 LIPID PANEL: CPT

## 2020-01-03 PROCEDURE — 83001 ASSAY OF GONADOTROPIN (FSH): CPT

## 2020-01-03 PROCEDURE — 84443 ASSAY THYROID STIM HORMONE: CPT

## 2020-01-03 NOTE — TELEPHONE ENCOUNTER
SPOKE TO PATIENT REGARDING HER ER VISIT SHE SPOKE TO GYNECOLOGY WHO DID NOT THINK THAT SHE HAD A TUBAL PREGNANCY AND ORDERED A QUANTITATIVE BETA HCG FOR 2 DAYS RESULTS COMPARED TO 2 DAYS AGO SHOWED A VALUE DOUBLING FROM 31-58.  PATIENT NOT EXPERIENCING BLEEDING OR PAIN AT THIS POINT WILL REPEAT BETA HCG IN 2 WEEKS PATIENT ADVISED THAT IF SHE EXPERIENCES PAIN OF BLEEDING SHE SHOULD GO IMMEDIATELY TO THE EMERGENCY ROOM FOR EVALUATION

## 2020-01-03 NOTE — TELEPHONE ENCOUNTER
Please see message and advise     ----- Message from Tracie Luna sent at 1/3/2020  4:28 PM CST -----  Contact: [phone]  Patient is requesting a call from the office in regards to lab results concerning her pregnancy .  Please advise, thanks

## 2020-01-07 ENCOUNTER — TELEPHONE (OUTPATIENT)
Dept: OBSTETRICS AND GYNECOLOGY | Facility: CLINIC | Age: 35
End: 2020-01-07

## 2020-01-07 ENCOUNTER — LAB VISIT (OUTPATIENT)
Dept: LAB | Facility: HOSPITAL | Age: 35
End: 2020-01-07
Attending: OBSTETRICS & GYNECOLOGY
Payer: COMMERCIAL

## 2020-01-07 ENCOUNTER — PATIENT MESSAGE (OUTPATIENT)
Dept: OBSTETRICS AND GYNECOLOGY | Facility: CLINIC | Age: 35
End: 2020-01-07

## 2020-01-07 DIAGNOSIS — O20.0 THREATENED ABORTION IN EARLY PREGNANCY: ICD-10-CM

## 2020-01-07 DIAGNOSIS — O20.0 THREATENED ABORTION IN EARLY PREGNANCY: Primary | ICD-10-CM

## 2020-01-07 LAB
HCG INTACT+B SERPL-ACNC: 167 MIU/ML
PROGEST SERPL-MCNC: 6.9 NG/ML

## 2020-01-07 PROCEDURE — 36415 COLL VENOUS BLD VENIPUNCTURE: CPT

## 2020-01-07 PROCEDURE — 84144 ASSAY OF PROGESTERONE: CPT

## 2020-01-07 PROCEDURE — 84702 CHORIONIC GONADOTROPIN TEST: CPT

## 2020-01-07 NOTE — TELEPHONE ENCOUNTER
----- Message from Jordan Armenta, Patient Care Assistant sent at 1/7/2020  4:34 PM CST -----  Contact: DEON HARRIS [[id number]]  Name of Who is Calling: DEON HARRIS [[id number]]    What is the request in detail:Requesting a call back in regards of results.  Please contact to further discuss and advise      Can the clinic reply by [facility]: No    What [facility]:  [phone]

## 2020-01-07 NOTE — TELEPHONE ENCOUNTER
Patient called to see if her results were ready. Patient advised that her labs were still in process. Patient is requesting a call as soon as the results are posted.

## 2020-01-07 NOTE — TELEPHONE ENCOUNTER
HCG 1/3/2020 51, per Dr. Roche repeat today. Pending results today will move forward with recommendation of repeat lab or ultrasound.

## 2020-01-07 NOTE — TELEPHONE ENCOUNTER
Pt informed still in process, pending lab results will be in touch regarding recommendations of ultrasound versus repeat labs.

## 2020-01-07 NOTE — TELEPHONE ENCOUNTER
Patient reports vaginal bleeding spotting with restroom use, report cramping. Informed per Dr. Roche can take Tylenol for discomforts, report to ED for pelvic pain not resolved with Tylenol or more to one side. Patient to report to ED if bleeding/spotting increases filling pad with in hour. Also patient has blood type a negative recorded in chart.

## 2020-01-08 ENCOUNTER — TELEPHONE (OUTPATIENT)
Dept: OBSTETRICS AND GYNECOLOGY | Facility: CLINIC | Age: 35
End: 2020-01-08

## 2020-01-08 ENCOUNTER — TELEPHONE (OUTPATIENT)
Dept: MATERNAL FETAL MEDICINE | Facility: CLINIC | Age: 35
End: 2020-01-08

## 2020-01-08 ENCOUNTER — PATIENT MESSAGE (OUTPATIENT)
Dept: OBSTETRICS AND GYNECOLOGY | Facility: CLINIC | Age: 35
End: 2020-01-08

## 2020-01-08 ENCOUNTER — CLINICAL SUPPORT (OUTPATIENT)
Dept: OBSTETRICS AND GYNECOLOGY | Facility: CLINIC | Age: 35
End: 2020-01-08
Payer: COMMERCIAL

## 2020-01-08 ENCOUNTER — LAB VISIT (OUTPATIENT)
Dept: LAB | Facility: OTHER | Age: 35
End: 2020-01-08
Attending: OBSTETRICS & GYNECOLOGY
Payer: COMMERCIAL

## 2020-01-08 DIAGNOSIS — O20.0 THREATENED ABORTION IN EARLY PREGNANCY: ICD-10-CM

## 2020-01-08 DIAGNOSIS — Z29.13 ENCOUNTER FOR PROPHYLACTIC ADMINISTRATION OF RHOGAM: ICD-10-CM

## 2020-01-08 DIAGNOSIS — O20.0 THREATENED ABORTION IN EARLY PREGNANCY: Primary | ICD-10-CM

## 2020-01-08 LAB
ABO + RH BLD: NORMAL
BLD GP AB SCN CELLS X3 SERPL QL: NORMAL

## 2020-01-08 PROCEDURE — 99999 PR PBB SHADOW E&M-EST. PATIENT-LVL I: ICD-10-PCS | Mod: PBBFAC,,,

## 2020-01-08 PROCEDURE — 99999 PR PBB SHADOW E&M-EST. PATIENT-LVL I: CPT | Mod: PBBFAC,,,

## 2020-01-08 PROCEDURE — 96372 RHO (D) IMMUNE GLOBULIN: ICD-10-PCS | Mod: S$GLB,,, | Performed by: OBSTETRICS & GYNECOLOGY

## 2020-01-08 PROCEDURE — 36415 COLL VENOUS BLD VENIPUNCTURE: CPT

## 2020-01-08 PROCEDURE — 86850 RBC ANTIBODY SCREEN: CPT

## 2020-01-08 PROCEDURE — 96372 THER/PROPH/DIAG INJ SC/IM: CPT | Mod: S$GLB,,, | Performed by: OBSTETRICS & GYNECOLOGY

## 2020-01-08 NOTE — TELEPHONE ENCOUNTER
Patient was notified of ultrasound on Monday 1/13/20 at 1pm in MFM clinic on 4th floor of hospital at [facility].    Pt verbalized understanding of information.

## 2020-01-08 NOTE — PROGRESS NOTES
Here for rhogam injection, no complaints at this time. Verified patient is RH negative. No complaint of pain before or after injection. Patient advised to wait 15 minutes before leaving and report any adverse reactions.    Site:  RB

## 2020-01-08 NOTE — TELEPHONE ENCOUNTER
Dr. Roche reviewed levels, pt to have type and screen then rhogam today. Repeat hcg and ultrasound Monday. Patient reports brownish spotting no cramping or pain. Patient given ER and Ectopic instructions patient verbalized understanding.

## 2020-01-09 ENCOUNTER — PATIENT MESSAGE (OUTPATIENT)
Dept: OBSTETRICS AND GYNECOLOGY | Facility: CLINIC | Age: 35
End: 2020-01-09

## 2020-01-09 ENCOUNTER — TELEPHONE (OUTPATIENT)
Dept: OBSTETRICS AND GYNECOLOGY | Facility: CLINIC | Age: 35
End: 2020-01-09

## 2020-01-09 DIAGNOSIS — O20.0 THREATENED ABORTION: Primary | ICD-10-CM

## 2020-01-10 ENCOUNTER — PATIENT MESSAGE (OUTPATIENT)
Dept: OBSTETRICS AND GYNECOLOGY | Facility: CLINIC | Age: 35
End: 2020-01-10

## 2020-01-10 ENCOUNTER — TELEPHONE (OUTPATIENT)
Dept: OBSTETRICS AND GYNECOLOGY | Facility: CLINIC | Age: 35
End: 2020-01-10

## 2020-01-10 ENCOUNTER — LAB VISIT (OUTPATIENT)
Dept: LAB | Facility: OTHER | Age: 35
End: 2020-01-10
Attending: OBSTETRICS & GYNECOLOGY
Payer: COMMERCIAL

## 2020-01-10 DIAGNOSIS — O20.0 THREATENED ABORTION: ICD-10-CM

## 2020-01-10 LAB — HCG INTACT+B SERPL-ACNC: 82 MIU/ML

## 2020-01-10 PROCEDURE — 36415 COLL VENOUS BLD VENIPUNCTURE: CPT

## 2020-01-10 PROCEDURE — 84702 CHORIONIC GONADOTROPIN TEST: CPT

## 2020-01-10 NOTE — TELEPHONE ENCOUNTER
----- Message from Fanny Marie sent at 1/10/2020 10:34 AM CST -----  Contact: pt  Name of Who is Calling: Cass Merlos    What is the request in detail: pt would like her test results. Please contact to further discuss and advise.       Can the clinic reply by [facility]: N       What Number to Call Back if not in [facility]: [phone]

## 2020-01-10 NOTE — TELEPHONE ENCOUNTER
Per Dr. Roche, HCG consistent with missed ab, repeat lab in 1 week. Patient to avoid conception for 2-3 normal cycles. Patient to contact ED or office if bleeding fills pad with in one hour. Patient desires pregnancy instructed on folic acid 400 mcg, patient reports taking 600 mcg. Patient scheduled for HCG 1/17 and 2/6 for WWE and f/u.

## 2020-01-17 ENCOUNTER — PATIENT MESSAGE (OUTPATIENT)
Dept: OBSTETRICS AND GYNECOLOGY | Facility: CLINIC | Age: 35
End: 2020-01-17

## 2020-01-20 ENCOUNTER — LAB VISIT (OUTPATIENT)
Dept: LAB | Facility: HOSPITAL | Age: 35
End: 2020-01-20
Attending: OBSTETRICS & GYNECOLOGY
Payer: COMMERCIAL

## 2020-01-20 DIAGNOSIS — O20.0 THREATENED ABORTION IN EARLY PREGNANCY: ICD-10-CM

## 2020-01-20 LAB — HCG INTACT+B SERPL-ACNC: 50 MIU/ML

## 2020-01-20 PROCEDURE — 84702 CHORIONIC GONADOTROPIN TEST: CPT

## 2020-01-20 PROCEDURE — 36415 COLL VENOUS BLD VENIPUNCTURE: CPT

## 2020-01-21 ENCOUNTER — TELEPHONE (OUTPATIENT)
Dept: OBSTETRICS AND GYNECOLOGY | Facility: CLINIC | Age: 35
End: 2020-01-21

## 2020-01-21 DIAGNOSIS — O20.0 THREATENED ABORTION: Primary | ICD-10-CM

## 2020-01-21 NOTE — TELEPHONE ENCOUNTER
Pt phoned wanting to know results  Stated Dr. Roche is out of office and needs to review first  Stated we will get back to her once reviewed.

## 2020-01-21 NOTE — TELEPHONE ENCOUNTER
----- Message from Alicia Kim NP sent at 1/21/2020 10:10 AM CST -----  hcg level dropped from 82 to 50.  She will need to repeat the hcg level again in 1-2 weeks. Order is in as a standing order. We will follow her hcg levels until less than 5 consistent with non- pregnant state to ensure there are no retained products of conception.    ----- Message -----  From: Morenita Riley MA  Sent: 1/21/2020  10:05 AM CST  To: Alicia Kim NP    Pt would like her results of her HCG, please review and advise

## 2020-01-21 NOTE — TELEPHONE ENCOUNTER
Spoke to pt.  Told her Dr. Roche was out of office and BARBIE Kim reviewed her hcg levels.    Per NP Judy, told her they had dropped from 82 to 50. She will need to repeat hcg level again in 1-2 weeks. Orders is in as a standing order. We will continue to monitor til less than  5 consistent with non-pregnant state.    Pt requested Nely phone her to discuss. I told pt I would relay the message once Nely is back in office.    Pt verbalized understanding.

## 2020-01-22 ENCOUNTER — TELEPHONE (OUTPATIENT)
Dept: OBSTETRICS AND GYNECOLOGY | Facility: CLINIC | Age: 35
End: 2020-01-22

## 2020-01-22 NOTE — TELEPHONE ENCOUNTER
----- Message from Jimenez Salguero sent at 1/22/2020  7:57 AM CST -----  Contact: DEON HARRIS   Name of Who is Calling: DEON HARRIS       What is the request in detail: PATIENT IS REQUESTING TO SPEAK ONLY TO DR. SOSA REGARDING HER LAB RESULTS. Please contact to further advise.      Can the clinic reply by [facility]: NO      What Number to Call Back if not in [facility]: [phone]

## 2020-01-22 NOTE — TELEPHONE ENCOUNTER
Per Dr. Roche, repeat lab in 2 weeks. Desires to start ocps with menses. Patient weill cb with menses

## 2020-02-05 ENCOUNTER — LAB VISIT (OUTPATIENT)
Dept: LAB | Facility: HOSPITAL | Age: 35
End: 2020-02-05
Payer: COMMERCIAL

## 2020-02-05 ENCOUNTER — PATIENT MESSAGE (OUTPATIENT)
Dept: OBSTETRICS AND GYNECOLOGY | Facility: CLINIC | Age: 35
End: 2020-02-05

## 2020-02-05 DIAGNOSIS — O20.0 THREATENED ABORTION: ICD-10-CM

## 2020-02-05 PROCEDURE — 36415 COLL VENOUS BLD VENIPUNCTURE: CPT

## 2020-02-05 PROCEDURE — 84702 CHORIONIC GONADOTROPIN TEST: CPT

## 2020-02-06 ENCOUNTER — PATIENT MESSAGE (OUTPATIENT)
Dept: OBSTETRICS AND GYNECOLOGY | Facility: CLINIC | Age: 35
End: 2020-02-06

## 2020-02-06 LAB — HCG INTACT+B SERPL-ACNC: <1.2 MIU/ML

## 2020-02-06 NOTE — TELEPHONE ENCOUNTER
----- Message from Brandyn Roche IV, MD sent at 2/6/2020  9:58 AM CST -----  Beta HCG level negative/normal.  Contact patient with results.    Brandyn Roche IV, MD

## 2020-03-13 ENCOUNTER — TELEPHONE (OUTPATIENT)
Dept: OBSTETRICS AND GYNECOLOGY | Facility: CLINIC | Age: 35
End: 2020-03-13

## 2020-03-13 NOTE — TELEPHONE ENCOUNTER
----- Message from Fanny Marie sent at 3/12/2020  2:30 PM CDT -----  Contact: pt   Name of Who is Calling: Cass eMrlos      What is the request in detail: pt is requesting to speak with janeen. No details given. (pt did have positive pregnancy test message was sent to OB navigator) .Please contact to further discuss and advise.      Can the clinic reply by [facility]: n       What Number to Call Back if not in [facility]: [phone] (home)

## 2020-03-13 NOTE — TELEPHONE ENCOUNTER
Pt newly pregnant GA approx 4 weeks, Lmp 2/10/20  Pt booked for confirmation and Initial OB visits  Pt informed US schedule not open as of today

## 2020-03-15 ENCOUNTER — PATIENT MESSAGE (OUTPATIENT)
Dept: OBSTETRICS AND GYNECOLOGY | Facility: CLINIC | Age: 35
End: 2020-03-15

## 2020-03-16 ENCOUNTER — TELEPHONE (OUTPATIENT)
Dept: OBSTETRICS AND GYNECOLOGY | Facility: CLINIC | Age: 35
End: 2020-03-16

## 2020-03-16 NOTE — TELEPHONE ENCOUNTER
Patient states doing well has no complaints. Denies any vaginal bleeding. Informed if occurs, to contact office for any changes in condition, bleeding or pelvic pain. Patient verbalized understanding.

## 2020-03-18 ENCOUNTER — PATIENT MESSAGE (OUTPATIENT)
Dept: OBSTETRICS AND GYNECOLOGY | Facility: CLINIC | Age: 35
End: 2020-03-18

## 2020-03-30 ENCOUNTER — TELEPHONE (OUTPATIENT)
Dept: OBSTETRICS AND GYNECOLOGY | Facility: CLINIC | Age: 35
End: 2020-03-30

## 2020-03-30 NOTE — TELEPHONE ENCOUNTER
----- Message from Danica Young sent at 3/30/2020  3:11 PM CDT -----  Contact: DEON HARRIS [[id number]]  Name of Who is Calling : DEON HARRIS [[id number]]    Patient is requesting a call from staff in regards to rescheduling her confirmation wanting some advice  .....Please contact to further discuss and advise.    Can the clinic reply by [facility] : No    What Number to Call Back :  [phone]

## 2020-03-31 ENCOUNTER — TELEPHONE (OUTPATIENT)
Dept: OBSTETRICS AND GYNECOLOGY | Facility: CLINIC | Age: 35
End: 2020-03-31

## 2020-03-31 DIAGNOSIS — O20.0 THREATENED ABORTION: Primary | ICD-10-CM

## 2020-04-02 ENCOUNTER — TELEPHONE (OUTPATIENT)
Dept: OBSTETRICS AND GYNECOLOGY | Facility: CLINIC | Age: 35
End: 2020-04-02

## 2020-04-03 ENCOUNTER — PROCEDURE VISIT (OUTPATIENT)
Dept: OBSTETRICS AND GYNECOLOGY | Facility: CLINIC | Age: 35
End: 2020-04-03
Payer: COMMERCIAL

## 2020-04-03 ENCOUNTER — LAB VISIT (OUTPATIENT)
Dept: LAB | Facility: OTHER | Age: 35
End: 2020-04-03
Attending: NURSE PRACTITIONER
Payer: COMMERCIAL

## 2020-04-03 ENCOUNTER — PATIENT MESSAGE (OUTPATIENT)
Dept: OBSTETRICS AND GYNECOLOGY | Facility: CLINIC | Age: 35
End: 2020-04-03

## 2020-04-03 DIAGNOSIS — N91.2 AMENORRHEA: ICD-10-CM

## 2020-04-03 DIAGNOSIS — Z32.01 POSITIVE PREGNANCY TEST: ICD-10-CM

## 2020-04-03 DIAGNOSIS — N91.5 OLIGOMENORRHEA, UNSPECIFIED TYPE: ICD-10-CM

## 2020-04-03 DIAGNOSIS — O20.0 THREATENED ABORTION IN EARLY PREGNANCY: ICD-10-CM

## 2020-04-03 DIAGNOSIS — N91.5 OLIGOMENORRHEA, UNSPECIFIED TYPE: Primary | ICD-10-CM

## 2020-04-03 LAB
ABO + RH BLD: NORMAL
BASOPHILS # BLD AUTO: 0.05 K/UL (ref 0–0.2)
BASOPHILS NFR BLD: 0.4 % (ref 0–1.9)
BLD GP AB SCN CELLS X3 SERPL QL: NORMAL
BLOOD GROUP ANTIBODIES SERPL: NORMAL
DIFFERENTIAL METHOD: ABNORMAL
EOSINOPHIL # BLD AUTO: 0.1 K/UL (ref 0–0.5)
EOSINOPHIL NFR BLD: 0.6 % (ref 0–8)
ERYTHROCYTE [DISTWIDTH] IN BLOOD BY AUTOMATED COUNT: 12.1 % (ref 11.5–14.5)
HCG INTACT+B SERPL-ACNC: NORMAL MIU/ML
HCT VFR BLD AUTO: 39.6 % (ref 37–48.5)
HGB BLD-MCNC: 13.2 G/DL (ref 12–16)
IMM GRANULOCYTES # BLD AUTO: 0.05 K/UL (ref 0–0.04)
IMM GRANULOCYTES NFR BLD AUTO: 0.4 % (ref 0–0.5)
LYMPHOCYTES # BLD AUTO: 3 K/UL (ref 1–4.8)
LYMPHOCYTES NFR BLD: 21.5 % (ref 18–48)
MCH RBC QN AUTO: 31.9 PG (ref 27–31)
MCHC RBC AUTO-ENTMCNC: 33.3 G/DL (ref 32–36)
MCV RBC AUTO: 96 FL (ref 82–98)
MONOCYTES # BLD AUTO: 0.8 K/UL (ref 0.3–1)
MONOCYTES NFR BLD: 5.4 % (ref 4–15)
NEUTROPHILS # BLD AUTO: 9.9 K/UL (ref 1.8–7.7)
NEUTROPHILS NFR BLD: 71.7 % (ref 38–73)
NRBC BLD-RTO: 0 /100 WBC
PLATELET # BLD AUTO: 380 K/UL (ref 150–350)
PMV BLD AUTO: 9.9 FL (ref 9.2–12.9)
RBC # BLD AUTO: 4.14 M/UL (ref 4–5.4)
WBC # BLD AUTO: 13.86 K/UL (ref 3.9–12.7)

## 2020-04-03 PROCEDURE — 36415 COLL VENOUS BLD VENIPUNCTURE: CPT

## 2020-04-03 PROCEDURE — 76801 OB US < 14 WKS SINGLE FETUS: CPT | Mod: S$GLB,,, | Performed by: OBSTETRICS & GYNECOLOGY

## 2020-04-03 PROCEDURE — 76801 PR US, OB <14WKS, TRANSABD, SINGLE GESTATION: ICD-10-PCS | Mod: S$GLB,,, | Performed by: OBSTETRICS & GYNECOLOGY

## 2020-04-03 PROCEDURE — 86870 RBC ANTIBODY IDENTIFICATION: CPT

## 2020-04-03 PROCEDURE — 86850 RBC ANTIBODY SCREEN: CPT

## 2020-04-03 PROCEDURE — 86592 SYPHILIS TEST NON-TREP QUAL: CPT

## 2020-04-03 PROCEDURE — 86762 RUBELLA ANTIBODY: CPT

## 2020-04-03 PROCEDURE — 86703 HIV-1/HIV-2 1 RESULT ANTBDY: CPT

## 2020-04-03 PROCEDURE — 85025 COMPLETE CBC W/AUTO DIFF WBC: CPT

## 2020-04-03 PROCEDURE — 87340 HEPATITIS B SURFACE AG IA: CPT

## 2020-04-03 PROCEDURE — 84702 CHORIONIC GONADOTROPIN TEST: CPT

## 2020-04-03 NOTE — TELEPHONE ENCOUNTER
----- Message from Mitch Murphy sent at 4/3/2020  1:29 PM CDT -----  Contact: Self      Name of Who is Calling: DEON HARRIS [[id number]]      What is the request in detail: Pt called to speak with staff about appt.Please contact to further discuss and advise.        Can the clinic reply by [facility]: Y      What [facility]: [phone]

## 2020-04-04 ENCOUNTER — PATIENT MESSAGE (OUTPATIENT)
Dept: OBSTETRICS AND GYNECOLOGY | Facility: CLINIC | Age: 35
End: 2020-04-04

## 2020-04-05 ENCOUNTER — PATIENT MESSAGE (OUTPATIENT)
Dept: OBSTETRICS AND GYNECOLOGY | Facility: CLINIC | Age: 35
End: 2020-04-05

## 2020-04-06 LAB
HBV SURFACE AG SERPL QL IA: NEGATIVE
HIV 1+2 AB+HIV1 P24 AG SERPL QL IA: NEGATIVE
RPR SER QL: NORMAL
RUBV IGG SER-ACNC: 258 IU/ML
RUBV IGG SER-IMP: REACTIVE

## 2020-04-07 ENCOUNTER — TELEPHONE (OUTPATIENT)
Dept: OBSTETRICS AND GYNECOLOGY | Facility: CLINIC | Age: 35
End: 2020-04-07

## 2020-04-07 NOTE — TELEPHONE ENCOUNTER
----- Message from Candace Armenta sent at 4/7/2020 11:19 AM CDT -----  Contact: DEON HARRIS [[id number]]  Type:  Patient Returning Call    Who Called: DEON HARRIS [[id number]]    Who Left Message for Patient: Agnes Lund    Does the patient know what this is regarding?:Y    Best Call Back Number:[phone]    Additional Information:

## 2020-04-07 NOTE — TELEPHONE ENCOUNTER
Patient has been scheduled for an audio visit. Patient verbalized understanding, and knows that the registration desk will call prior to the visit.

## 2020-04-08 ENCOUNTER — TELEPHONE (OUTPATIENT)
Dept: OBSTETRICS AND GYNECOLOGY | Facility: CLINIC | Age: 35
End: 2020-04-08

## 2020-04-08 ENCOUNTER — OFFICE VISIT (OUTPATIENT)
Dept: OBSTETRICS AND GYNECOLOGY | Facility: CLINIC | Age: 35
End: 2020-04-08
Payer: COMMERCIAL

## 2020-04-08 DIAGNOSIS — Z34.80 SUPERVISION OF OTHER NORMAL PREGNANCY, ANTEPARTUM: Primary | ICD-10-CM

## 2020-04-08 PROCEDURE — 99214 PR OFFICE/OUTPT VISIT, EST, LEVL IV, 30-39 MIN: ICD-10-PCS | Mod: 95,,, | Performed by: NURSE PRACTITIONER

## 2020-04-08 PROCEDURE — 99214 OFFICE O/P EST MOD 30 MIN: CPT | Mod: 95,,, | Performed by: NURSE PRACTITIONER

## 2020-04-08 NOTE — PROGRESS NOTES
The patient location is: home   The chief complaint leading to consultation is: pregnancy confirmation   Visit type: Virtual visit with synchronous audio and video  Total time spent with patient: 30 minutes   Each patient to whom he or she provides medical services by telemedicine is:  (1) informed of the relationship between the physician and patient and the respective role of any other health care provider with respect to management of the patient; and (2) notified that he or she may decline to receive medical services by telemedicine and may withdraw from such care at any time.    Notes:     CC: Positive Pregnancy Test    HISTORY OF PRESENT ILLNESS:    Cass Merlos is a 34 y.o. female, ,  Presents today for a routine exam complaining of amenorrhea and positive home urine pregnancy test.  No LMP recorded. (Menstrual status: Birth Control).   She is not currently on any contraception.  Reports nausea. Reports breast tenderness. Denies vaginal bleeding and pelvic pain.  Reports constipation. Reports hair loss. Reports insomnia.   Medical h/o palpations with SVT, tobacco abuse (currently weaning), ADHD (stopped Adderall with + UPT), GERD and anxiety.   Prior  X 1 - full term/ uncomplicated pregnancy.  Baby went to NICU and was diagnosed with platelet deficiency and an immune disorder.  Prior SAB X 1.   Works as  for [employer].        ROS:  GENERAL: No weight changes. No swelling. No fatigue. No fever.  CARDIOVASCULAR: No chest pain. No shortness of breath. No leg cramps.   NEUROLOGICAL: No headaches. No vision changes.  BREASTS: No pain. No lumps. No discharge.  ABDOMEN: No pain. No diarrhea. No constipation.  REPRODUCTIVE: No abnormal bleeding.   VULVA: No pain. No lesions. No itching.  VAGINA: No relaxation. No itching. No odor. No discharge. No lesions.  URINARY: No incontinence. No nocturia. No frequency. No dysuria.    MEDICATIONS AND  ALLERGIES:  Reviewed        COMPREHENSIVE GYN HISTORY:  PAP History: Denies abnormal Paps.  Infection History: Denies STDs. Denies PID.  Benign History: Denies uterine fibroids. Denies ovarian cysts. Denies endometriosis. Denies other conditions.  Cancer History: Denies cervical cancer. Denies uterine cancer or hyperplasia. Denies ovarian cancer. Denies vulvar cancer or pre-cancer. Denies vaginal cancer or pre-cancer. Denies breast cancer. Denies colon cancer.  Sexual Activity History: Reports currently being sexually active  Menstrual History: None.  Contraception: None    There were no vitals taken for this visit.    PE:  AFFECT: Calm, alert and oriented X 3. Interactive during exam  Deferred Telemedicine     PROCEDURES:  UPT Positive  Genprobe        ASSESSMENT/PLAN:  Amenorrhea  Positive urine pregnancy test (AMY: 20)    -  Routine prenatal care    Nausea and vomiting in pregnancy    -  Education regarding lifestyle and dietary modifications    -  Advised use of B6/Unisom. Pt will notify us if no relief/worsening symptoms, will consider Zofran if needed.      1st TRIMESTER COUNSELING: Discussed all, booklet provided:  Common complaints of pregnancy  HIV and other routine prenatal tests including  genetic screening  Risk factors identified by prenatal history  Oriented to practice - discussed anticipated course of prenatal care & indications for Ultrasound  Childbirth classes/Hospital facilities   Nutrition and weight gain counseling  Toxoplasmosis precautions (Cats/Raw Meat)  Sexual activity and exercise  Environmental/Work hazards  Travel  Tobacco (Ask, Advise, Assess, Assist, and Arrange), as well as alcohol and drug use  Use of any medications (Including supplements, Vitamins, Herbs, or OTC Drugs)  Domestic violence  Seat belt use      TERATOLOGY COUNSELING:   Discussed indications and options for aneuploidy screening - pamphlets given    -  Pt desires [id number] and info for Integrated  Genetics  given to determine cost/ coverage    Dating US done yesterday   FOLLOW-UP in 4 weeks with Dr. Melchor Kim NP    OB/GYN

## 2020-04-09 ENCOUNTER — TELEPHONE (OUTPATIENT)
Dept: OBSTETRICS AND GYNECOLOGY | Facility: CLINIC | Age: 35
End: 2020-04-09

## 2020-04-09 NOTE — TELEPHONE ENCOUNTER
----- Message from Jordan Armenta, Patient Care Assistant sent at 4/9/2020 12:02 PM CDT -----  Contact: DEON HARRIS [[id number]]  Name of Who is Calling: DEON HARRIS [[id number]]    What is the request in detail: Requesting a call back in regards of testing. Please contact to further discuss and advise      Can the clinic reply by [facility]: No    What Number to Call Back if not in [facility]:  [phone]

## 2020-04-14 ENCOUNTER — TELEPHONE (OUTPATIENT)
Dept: OBSTETRICS AND GYNECOLOGY | Facility: CLINIC | Age: 35
End: 2020-04-14

## 2020-04-14 NOTE — TELEPHONE ENCOUNTER
----- Message from Jia Hendrix sent at 4/14/2020 12:37 PM CDT -----  Contact: Pt   Name of Who is Calling: DEON HARRIS [[id number]]    What is the request in detail: Patient is requesting a call back regards to the approval for her procedure and the pt ate something really spicy now her throat is burning.........  Please contact to further discuss and advise      Can the clinic reply by [facility]: Yes     What Number to Call Back if not in [facility]:  [phone] (home)

## 2020-04-16 ENCOUNTER — PATIENT MESSAGE (OUTPATIENT)
Dept: OBSTETRICS AND GYNECOLOGY | Facility: CLINIC | Age: 35
End: 2020-04-16

## 2020-04-24 ENCOUNTER — PATIENT MESSAGE (OUTPATIENT)
Dept: OBSTETRICS AND GYNECOLOGY | Facility: CLINIC | Age: 35
End: 2020-04-24

## 2020-05-02 ENCOUNTER — NURSE TRIAGE (OUTPATIENT)
Dept: ADMINISTRATIVE | Facility: CLINIC | Age: 35
End: 2020-05-02

## 2020-05-02 NOTE — TELEPHONE ENCOUNTER
"Patient was bitten by a spider, she thinks approx one hour ago, has taken benedryl, cleaned site and circled to check for spreading, however she is 12 weeks pregnant and had a previous loss, crying, [facility], accepted, instructed to also call OB unit to let her OB know. Verb understanding, ED education done, verb understanding, however states she is afraid to go to the ED, has immune compromised child at home.    Reason for Disposition   [1] SEVERE bite pain AND [2] not improved after 2 hours of pain medicine    Additional Information   Negative: Difficulty breathing or swallowing   Negative: Shock suspected (e.g., cold/pale/clammy skin, too weak to stand, low BP, rapid pulse)   Negative: Difficult to awaken or acting confused  (e.g., disoriented, slurred speech)   Negative: Sounds like a life-threatening emergency to the triager   Negative: Boil suspected (i.e., painful red lump and NO spider bite)   Negative: Not a spider bite   Negative: [1] Black  (or brown ) spider bite AND [2] local skin changes   Negative: Abdominal pain, chest tightness or other muscle cramps   Negative: Urine is brown, black or red in color   Negative: Vomiting   Negative: [1] Rash elsewhere on body AND [2] developed after spider bite   Negative: Patient sounds very sick or weak to the triager   Negative: [1] Fever AND [2] red area   Negative: [1] Fever AND [2] area is very tender to touch   Negative: [1] Red streak or red line AND [2] length > 2 inches (5 cm)    Answer Assessment - Initial Assessment Questions  1. TYPE of SPIDER: "What type of spider was it?"  (e.g., name, unknown, or brief description)   did not see  2. LOCATION: "Where is the bite located?"     leg  3. PAIN: "Is there any pain?" If so, ask: "How bad is it?"  (Scale 1-10; or mild, moderate, severe)    moderate  4. SWELLING: "How big is the swelling?" (Inches, cm or compare to coins)       *No Answer*  5. ONSET: "When did the bite occur?" " "(Minutes or hours ago)       *No Answer*  6. TETANUS: "When was the last tetanus booster?"       *No Answer*  7. OTHER SYMPTOMS: "Do you have any other symptoms?"  (e.g., muscle cramps, abdominal pain, change in urine color)     no    Protocols used: ST SPIDER BITE [facility]      " no

## 2020-05-04 ENCOUNTER — TELEPHONE (OUTPATIENT)
Dept: OPTOMETRY | Facility: CLINIC | Age: 35
End: 2020-05-04

## 2020-05-05 ENCOUNTER — OFFICE VISIT (OUTPATIENT)
Dept: OPTOMETRY | Facility: CLINIC | Age: 35
End: 2020-05-05
Payer: COMMERCIAL

## 2020-05-05 ENCOUNTER — INITIAL PRENATAL (OUTPATIENT)
Dept: OBSTETRICS AND GYNECOLOGY | Facility: CLINIC | Age: 35
End: 2020-05-05
Payer: COMMERCIAL

## 2020-05-05 ENCOUNTER — PATIENT MESSAGE (OUTPATIENT)
Dept: OPTOMETRY | Facility: CLINIC | Age: 35
End: 2020-05-05

## 2020-05-05 VITALS
SYSTOLIC BLOOD PRESSURE: 112 MMHG | DIASTOLIC BLOOD PRESSURE: 62 MMHG | WEIGHT: 112.88 LBS | BODY MASS INDEX: 19.68 KG/M2

## 2020-05-05 DIAGNOSIS — Z36.3 ENCOUNTER FOR ANTENATAL SCREENING FOR MALFORMATION USING ULTRASOUND: ICD-10-CM

## 2020-05-05 DIAGNOSIS — Z67.91 RH NEGATIVE STATE IN ANTEPARTUM PERIOD: ICD-10-CM

## 2020-05-05 DIAGNOSIS — H10.523 ANGULAR BLEPHAROCONJUNCTIVITIS OF BOTH EYES: Primary | ICD-10-CM

## 2020-05-05 DIAGNOSIS — H04.123 DRY EYE SYNDROME OF BOTH EYES: ICD-10-CM

## 2020-05-05 DIAGNOSIS — O09.521 AMA (ADVANCED MATERNAL AGE) MULTIGRAVIDA 35+, FIRST TRIMESTER: Primary | ICD-10-CM

## 2020-05-05 DIAGNOSIS — H16.142 SUPERFICIAL PUNCTATE KERATITIS OF LEFT EYE: ICD-10-CM

## 2020-05-05 DIAGNOSIS — H52.203 MYOPIA WITH ASTIGMATISM, BILATERAL: ICD-10-CM

## 2020-05-05 DIAGNOSIS — O26.899 RH NEGATIVE STATE IN ANTEPARTUM PERIOD: ICD-10-CM

## 2020-05-05 DIAGNOSIS — H52.13 MYOPIA WITH ASTIGMATISM, BILATERAL: ICD-10-CM

## 2020-05-05 PROCEDURE — 99999 PR PBB SHADOW E&M-EST. PATIENT-LVL II: CPT | Mod: PBBFAC,,, | Performed by: OPTOMETRIST

## 2020-05-05 PROCEDURE — 99999 PR PBB SHADOW E&M-EST. PATIENT-LVL III: CPT | Mod: PBBFAC,,, | Performed by: OBSTETRICS & GYNECOLOGY

## 2020-05-05 PROCEDURE — 92004 COMPRE OPH EXAM NEW PT 1/>: CPT | Mod: S$GLB,,, | Performed by: OPTOMETRIST

## 2020-05-05 PROCEDURE — 99999 PR PBB SHADOW E&M-EST. PATIENT-LVL III: ICD-10-PCS | Mod: PBBFAC,,, | Performed by: OBSTETRICS & GYNECOLOGY

## 2020-05-05 PROCEDURE — 0500F PR INITIAL PRENATAL CARE VISIT: ICD-10-PCS | Mod: S$GLB,,, | Performed by: OBSTETRICS & GYNECOLOGY

## 2020-05-05 PROCEDURE — 92015 DETERMINE REFRACTIVE STATE: CPT | Mod: S$GLB,,, | Performed by: OPTOMETRIST

## 2020-05-05 PROCEDURE — 99999 PR PBB SHADOW E&M-EST. PATIENT-LVL II: ICD-10-PCS | Mod: PBBFAC,,, | Performed by: OPTOMETRIST

## 2020-05-05 PROCEDURE — 0500F INITIAL PRENATAL CARE VISIT: CPT | Mod: S$GLB,,, | Performed by: OBSTETRICS & GYNECOLOGY

## 2020-05-05 PROCEDURE — 92015 PR REFRACTION: ICD-10-PCS | Mod: S$GLB,,, | Performed by: OPTOMETRIST

## 2020-05-05 PROCEDURE — 92004 PR EYE EXAM, NEW PATIENT,COMPREHESV: ICD-10-PCS | Mod: S$GLB,,, | Performed by: OPTOMETRIST

## 2020-05-05 RX ORDER — TOBRAMYCIN 3 MG/ML
1 SOLUTION/ DROPS OPHTHALMIC 4 TIMES DAILY
Qty: 5 ML | Refills: 0 | Status: SHIPPED | OUTPATIENT
Start: 2020-05-05 | End: 2020-05-12

## 2020-05-05 NOTE — PROGRESS NOTES
HPI     MARIIA:19months   Glasses? Yes   Contacts? Yes not sure of brand or type   H/o eye surgery, injections or laser: no  H/o eye injury: no  Known eye conditions? Amblyopia   Family h/o eye conditions? Mother-glaucoma   Eye gtts?OTC tears     (+) Flashes sparkles in periphery new/consitent  within 3 mon (+) Floaters   (-) Mucous   (-) Tearing (-) Itching (-) Burning   (+) Headaches mainly temple area, sometimes whole head/evenings, nights/no   relief while awake /sleeps, tylenol -relief (+) Eye Pain/discomfort (-)   Irritation   (-) Redness (-) Double vision (+) Blurry vision OU with current   prescription OS is worse     Diabetic? (-)  A1c?  (No results found for: HGBA1C)        Last edited by Tena Wilson on 5/5/2020  2:59 PM. (History)            Assessment /Plan     For exam results, see Encounter Report.    Angular blepharoconjunctivitis of both eyes  -     tobramycin sulfate 0.3% (TOBREX) 0.3 % ophthalmic solution; Place 1 drop into both eyes 4 (four) times daily. for 7 days  Dispense: 5 mL; Refill: 0    Superficial punctate keratitis of left eye    Dry eye syndrome of both eyes    Myopia with astigmatism, bilateral      1-2. Rx Tobramycin QId OU x 7 days.   3. Recommend Systane Ultra or Refresh Optive BID-TID OU to aid with symptoms of dry eyes.  4. SRx released to patient. Patient educated on lens options. Normal ocular health. RTC 2 weeks CL fitting.

## 2020-05-05 NOTE — PROGRESS NOTES
Ob counseling/1st trimester counseling performed  Patient desires Materni T21 testing  Ob labs reviewed  MFM/anatomy sonogram order placed

## 2020-05-09 ENCOUNTER — PATIENT MESSAGE (OUTPATIENT)
Dept: ADMINISTRATIVE | Facility: OTHER | Age: 35
End: 2020-05-09

## 2020-05-11 ENCOUNTER — TELEPHONE (OUTPATIENT)
Dept: OBSTETRICS AND GYNECOLOGY | Facility: CLINIC | Age: 35
End: 2020-05-11

## 2020-05-11 ENCOUNTER — PATIENT MESSAGE (OUTPATIENT)
Dept: OBSTETRICS AND GYNECOLOGY | Facility: CLINIC | Age: 35
End: 2020-05-11

## 2020-05-11 NOTE — TELEPHONE ENCOUNTER
Spoke to pt.  Pt wanted to know lab results and reiterated that we had not received them yet and once Dr. Roche reviews we would be in touch. She insisted lab said they were in today and I explained the process of lab resulting, sending a fax to us, and review by doctor. Told pt I phoned lab and gave them our direct fax and we will cb once reviewed by a doctor. Pt verbalized understanding

## 2020-05-15 ENCOUNTER — TELEPHONE (OUTPATIENT)
Dept: OBSTETRICS AND GYNECOLOGY | Facility: CLINIC | Age: 35
End: 2020-05-15

## 2020-05-15 NOTE — TELEPHONE ENCOUNTER
Spoke to pt let her know per BARBIE Kim that the sensation was normal. Reassured her this was her round ligament pain and a good sign. Pt verbalized understanding

## 2020-05-15 NOTE — TELEPHONE ENCOUNTER
----- Message from David Romero sent at 5/15/2020  3:16 PM CDT -----  Contact: DEON HARRIS [[id number]]  Name of Who is Calling: DEON HARRIS [[id number]]      What is the request in detail: Patient 13 wks, Would like to speak with staff in regards to burning sensation towards the right bottom of her belly, had for the last couple of days.       Can the clinic reply by [facility]: no      [facility] [phone]

## 2020-05-18 ENCOUNTER — TELEPHONE (OUTPATIENT)
Dept: OPTOMETRY | Facility: CLINIC | Age: 35
End: 2020-05-18

## 2020-05-18 ENCOUNTER — TELEPHONE (OUTPATIENT)
Dept: OBSTETRICS AND GYNECOLOGY | Facility: CLINIC | Age: 35
End: 2020-05-18

## 2020-05-18 ENCOUNTER — HOSPITAL ENCOUNTER (EMERGENCY)
Facility: OTHER | Age: 35
Discharge: HOME | End: 2020-05-18
Attending: EMERGENCY MEDICINE
Payer: COMMERCIAL

## 2020-05-18 VITALS
BODY MASS INDEX: 19.12 KG/M2 | WEIGHT: 112 LBS | HEIGHT: 64 IN | SYSTOLIC BLOOD PRESSURE: 105 MMHG | HEART RATE: 82 BPM | DIASTOLIC BLOOD PRESSURE: 56 MMHG | RESPIRATION RATE: 15 BRPM | TEMPERATURE: 98 F | OXYGEN SATURATION: 100 %

## 2020-05-18 DIAGNOSIS — Z3A.13 13 WEEKS GESTATION OF PREGNANCY: Primary | ICD-10-CM

## 2020-05-18 DIAGNOSIS — R55 SYNCOPE: ICD-10-CM

## 2020-05-18 LAB
ALBUMIN SERPL BCP-MCNC: 3.1 G/DL (ref 3.5–5.2)
ALP SERPL-CCNC: 59 U/L (ref 55–135)
ALT SERPL W/O P-5'-P-CCNC: 10 U/L (ref 10–44)
ANION GAP SERPL CALC-SCNC: 9 MMOL/L (ref 8–16)
AST SERPL-CCNC: 10 U/L (ref 10–40)
BASOPHILS # BLD AUTO: 0.04 K/UL (ref 0–0.2)
BASOPHILS NFR BLD: 0.3 % (ref 0–1.9)
BILIRUB SERPL-MCNC: 0.1 MG/DL (ref 0.1–1)
BILIRUB UR QL STRIP: NEGATIVE
BUN SERPL-MCNC: 11 MG/DL (ref 6–20)
CALCIUM SERPL-MCNC: 9.1 MG/DL (ref 8.7–10.5)
CHLORIDE SERPL-SCNC: 109 MMOL/L (ref 95–110)
CLARITY UR: CLEAR
CO2 SERPL-SCNC: 22 MMOL/L (ref 23–29)
COLOR UR: YELLOW
CREAT SERPL-MCNC: 0.6 MG/DL (ref 0.5–1.4)
DIFFERENTIAL METHOD: ABNORMAL
EOSINOPHIL # BLD AUTO: 0.1 K/UL (ref 0–0.5)
EOSINOPHIL NFR BLD: 0.6 % (ref 0–8)
ERYTHROCYTE [DISTWIDTH] IN BLOOD BY AUTOMATED COUNT: 12.2 % (ref 11.5–14.5)
EST. GFR  (AFRICAN AMERICAN): >60 ML/MIN/1.73 M^2
EST. GFR  (NON AFRICAN AMERICAN): >60 ML/MIN/1.73 M^2
GLUCOSE SERPL-MCNC: 91 MG/DL (ref 70–110)
GLUCOSE UR QL STRIP: NEGATIVE
HCT VFR BLD AUTO: 34.1 % (ref 37–48.5)
HGB BLD-MCNC: 11.2 G/DL (ref 12–16)
HGB UR QL STRIP: NEGATIVE
IMM GRANULOCYTES # BLD AUTO: 0.08 K/UL (ref 0–0.04)
IMM GRANULOCYTES NFR BLD AUTO: 0.5 % (ref 0–0.5)
KETONES UR QL STRIP: NEGATIVE
LEUKOCYTE ESTERASE UR QL STRIP: NEGATIVE
LYMPHOCYTES # BLD AUTO: 1.9 K/UL (ref 1–4.8)
LYMPHOCYTES NFR BLD: 11.7 % (ref 18–48)
MCH RBC QN AUTO: 31.5 PG (ref 27–31)
MCHC RBC AUTO-ENTMCNC: 32.8 G/DL (ref 32–36)
MCV RBC AUTO: 96 FL (ref 82–98)
MONOCYTES # BLD AUTO: 0.8 K/UL (ref 0.3–1)
MONOCYTES NFR BLD: 5.2 % (ref 4–15)
NEUTROPHILS # BLD AUTO: 12.9 K/UL (ref 1.8–7.7)
NEUTROPHILS NFR BLD: 81.7 % (ref 38–73)
NITRITE UR QL STRIP: NEGATIVE
NRBC BLD-RTO: 0 /100 WBC
PH UR STRIP: 6 [PH] (ref 5–8)
PLATELET # BLD AUTO: 336 K/UL (ref 150–350)
PMV BLD AUTO: 9.4 FL (ref 9.2–12.9)
POTASSIUM SERPL-SCNC: 3.5 MMOL/L (ref 3.5–5.1)
PROT SERPL-MCNC: 6.3 G/DL (ref 6–8.4)
PROT UR QL STRIP: NEGATIVE
RBC # BLD AUTO: 3.55 M/UL (ref 4–5.4)
SODIUM SERPL-SCNC: 140 MMOL/L (ref 136–145)
SP GR UR STRIP: >=1.03 (ref 1–1.03)
URN SPEC COLLECT METH UR: ABNORMAL
UROBILINOGEN UR STRIP-ACNC: NEGATIVE EU/DL
WBC # BLD AUTO: 15.75 K/UL (ref 3.9–12.7)

## 2020-05-18 PROCEDURE — 80053 COMPREHEN METABOLIC PANEL: CPT

## 2020-05-18 PROCEDURE — 25000003 PHARM REV CODE 250: Performed by: EMERGENCY MEDICINE

## 2020-05-18 PROCEDURE — 93010 ELECTROCARDIOGRAM REPORT: CPT | Mod: ,,, | Performed by: INTERNAL MEDICINE

## 2020-05-18 PROCEDURE — 85025 COMPLETE CBC W/AUTO DIFF WBC: CPT

## 2020-05-18 PROCEDURE — 96360 HYDRATION IV INFUSION INIT: CPT

## 2020-05-18 PROCEDURE — 99284 EMERGENCY DEPT VISIT MOD MDM: CPT | Mod: 25

## 2020-05-18 PROCEDURE — 93005 ELECTROCARDIOGRAM TRACING: CPT

## 2020-05-18 PROCEDURE — 93010 EKG 12-LEAD: ICD-10-PCS | Mod: ,,, | Performed by: INTERNAL MEDICINE

## 2020-05-18 PROCEDURE — 81003 URINALYSIS AUTO W/O SCOPE: CPT

## 2020-05-18 RX ADMIN — SODIUM CHLORIDE 1000 ML: 0.9 INJECTION, SOLUTION INTRAVENOUS at 05:05

## 2020-05-18 NOTE — ED NOTES
Appearance: Pt awake, alert & oriented to person, place & time. Pt in no acute distress at present time. Pt is clean and well groomed with clothes appropriately fastened.   Skin: Skin warm, dry & intact. Color consistent with ethnicity. Mucous membranes moist. No breakdown or brusing noted.   Musculoskeletal: Patient moving all extremities well, no obvious swelling or deformities noted.   Respiratory: Respirations spontaneous, even, and non-labored. Visible chest rise noted. Airway is open and patent. No accessory muscle use noted.   Neurologic: Sensation is intact. Speech is clear and appropriate. Eyes open spontaneously, behavior appropriate to situation, follows commands,  purposeful motor response noted.   Cardiac:  No Bilateral lower extremity edema. Cap refill is <3 seconds.  Abdomen: Pt denies active abd pains, cramping or discomfort, No N/V/D at this time. ABD contour symmetrical, round.   : Pt reports no dysuria or hematuria.

## 2020-05-18 NOTE — TELEPHONE ENCOUNTER
"Patient's MIL and patient were on the phone. Patient passed out and lost conscious. Patient is 13 weeks and unsure what she hit but did "bust her lip" . Patient was advised to go to the ED and will comply. States she is leaving in about 15 minutes  "

## 2020-05-18 NOTE — ED NOTES
Pt states she is 13 weeks pregnant, reporting she was using the toilet, attempting to have BM, and woke up on the ground. Was woken up by her son. States generalized head pain and pain to her lower lip. Pt with superficial laceration inside mouth on lower lip. States some nausea and vomiting after accident. Denies nausea, vomiting prior to fall. Pt AAOx4 and appropriate at this time. Respirations even and unlabored. No acute distress noted.

## 2020-05-18 NOTE — DISCHARGE INSTRUCTIONS
Stay well hydrated  Call your OB tomorrow to discuss your ER stay  Return to the ER for any other concerns

## 2020-05-18 NOTE — ED PROVIDER NOTES
"Encounter Date: 2020       History     Chief Complaint   Patient presents with    Loss of Consciousness     Reports using the bathroom today, started to sweat, and was then woken up on the bathroom floor, reports pain to lower lip     Time seen by provider: 5:42 PM    This is a 34 y.o. female at 14 weeks gestation who presents with complaint of syncopal episode that occurred prior to arrival. Patient reports syncopal episode while trying to have a BM earlier today. She notes that she felt constipated and was "straining." She states that her son found her on the bathroom floor. Patient currently reports pain to lower lip. She denies numbness, weakness, nausea, vomiting, abdominal pain, vaginal bleeding, dizziness, lightheadedness, chest pain, or SOB. She states she has been eating and drinking well lately. Patient reports history of SVT requiring cardiac ablation. She states that she had an ablation 11 years ago. Patient notes that she "noticed arrhythmias sometimes when she is stressed" but hasn't had one in a while.     The history is provided by the patient.     Review of patient's allergies indicates:   Allergen Reactions    Vicodin [hydrocodone-acetaminophen]      ITCHING     Past Medical History:   Diagnosis Date    Abnormal Pap smear     Allergy     Anxiety     GERD (gastroesophageal reflux disease)     Hemorrhoids without complication     Supraventricular tachycardia      Past Surgical History:   Procedure Laterality Date    CERVICAL BIOPSY  W/ LOOP ELECTRODE EXCISION      VAGINAL DELIVERY       Family History   Problem Relation Age of Onset    Arrhythmia Mother     Breast cancer Neg Hx     Cancer Neg Hx     Colon cancer Neg Hx     Diabetes Neg Hx     Eclampsia Neg Hx     Hypertension Neg Hx     Miscarriages / Stillbirths Neg Hx     Ovarian cancer Neg Hx      labor Neg Hx     Stroke Neg Hx      Social History     Tobacco Use    Smoking status: Current Some Day Smoker     " Packs/day: 0.50     Years: 6.00     Pack years: 3.00    Smokeless tobacco: Never Used   Substance Use Topics    Alcohol use: Yes     Alcohol/week: 2.0 standard drinks     Types: 2 Glasses of wine per week    Drug use: No     Review of Systems   Constitutional: Negative for fever.   HENT: Negative for sore throat.    Eyes: Negative for visual disturbance.   Respiratory: Negative for shortness of breath.    Cardiovascular: Negative for chest pain.   Gastrointestinal: Positive for constipation. Negative for abdominal pain, nausea and vomiting.   Genitourinary: Negative for dysuria.   Musculoskeletal: Negative for back pain.   Skin: Negative for rash.   Neurological: Positive for syncope. Negative for dizziness, facial asymmetry, speech difficulty, weakness, light-headedness, numbness and headaches.   Hematological: Does not bruise/bleed easily.       Physical Exam     Initial Vitals [05/18/20 1719]   BP Pulse Resp Temp SpO2   (!) 99/55 86 18 98 °F (36.7 °C) 100 %      MAP       --         Physical Exam    Nursing note and vitals reviewed.  Constitutional: She appears well-developed and well-nourished.   HENT:   Head: Normocephalic and atraumatic.   Cracked lips   Eyes: Conjunctivae and EOM are normal. Pupils are equal, round, and reactive to light.   Neck: Normal range of motion. Neck supple.   Cardiovascular: Normal rate, regular rhythm, normal heart sounds and normal pulses.   No murmur heard.  Pulmonary/Chest: Breath sounds normal. No respiratory distress.   Abdominal: Soft. There is no tenderness. There is no rebound and no guarding.   Gravid abdomen.   Neurological: She is alert and oriented to person, place, and time. She has normal strength. No cranial nerve deficit.   Skin: Skin is warm and dry.   Cracked fingers.   Psychiatric: She has a normal mood and affect. Her behavior is normal. Thought content normal.         ED Course   Procedures  Labs Reviewed   CBC W/ AUTO DIFFERENTIAL - Abnormal; Notable for the  following components:       Result Value    WBC 15.75 (*)     RBC 3.55 (*)     Hemoglobin 11.2 (*)     Hematocrit 34.1 (*)     Mean Corpuscular Hemoglobin 31.5 (*)     Gran # (ANC) 12.9 (*)     Immature Grans (Abs) 0.08 (*)     Gran% 81.7 (*)     Lymph% 11.7 (*)     All other components within normal limits   COMPREHENSIVE METABOLIC PANEL - Abnormal; Notable for the following components:    CO2 22 (*)     Albumin 3.1 (*)     All other components within normal limits   URINALYSIS - Abnormal; Notable for the following components:    Specific Gravity, UA >=1.030 (*)     All other components within normal limits          Imaging Results    None          Medical Decision Making:   History:   Old Medical Records: I decided to obtain old medical records.  Initial Assessment:   Plan for EKG to eval intervals and rhythm, CBC to check Hgb, CMP to eval electrolytes and UA to check hydration, UTI. Will start with IV fluids.     Bedside US with good fetal activity, movement discussed patient and her fiance, who took pictures..  Independently Interpreted Test(s):   I have ordered and independently interpreted EKG Reading(s) - see prior notes  Clinical Tests:   Lab Tests: Ordered and Reviewed  Radiological Study: Ordered and Reviewed  Medical Tests: Ordered and Reviewed  ED Management:  This is a well-appearing 34-year-old female, at 13 weeks gestation presenting the emergency department after she was in the bathroom going to the bathroom when she syncopized.  She did have brief loss of consciousness, is currently without complaints other than a mild body aches.  She has syncopized in the past, she is well appearing, alert and oriented x3.  Her blood pressure was low here, improved with IV fluids.  Patient's labs look fine, though clinically appear dry.  She had no abdominal discomfort no vaginal bleeding.  Bedside ultrasound with good fetal movement.  She was instructed to call her OB Gyne tomorrow, suspect her syncope, is  related to poor p.o. intake, vasovagal, and fluid shift changes of pregnancy. Specifically patient had no SOB, dyspnea or sudden onset headaches. She was instructed to follow up closely and return should anything change or worsen.            Scribe Attestation:   Scribe #1: I performed the above scribed service and the documentation accurately describes the services I performed. I attest to the accuracy of the note.    Attending Attestation:           Physician Attestation for Scribe:  Physician Attestation Statement for Scribe #1: I, Dr. Zafar, reviewed documentation, as scribed by Roxana Rios in my presence, and it is both accurate and complete.                 ED Course as of May 18 1940   Mon May 18, 2020   1842 WBC(!): 15.75 [MG]   1842 RBC(!): 3.55 [MG]   1842 Hemoglobin(!): 11.2 [MG]   1842 Hematocrit(!): 34.1 [MG]      ED Course User Index  [MG] Anusha Zafar MD                Clinical Impression:     1. 13 weeks gestation of pregnancy    2. Syncope                ED Disposition Condition    Discharge Stable        ED Prescriptions     None        Follow-up Information     Follow up With Specialties Details Why Contact Info    Mary Rose MD Family Medicine   [address]  [address]  [place] [facility] [id number] [phone]      Brandyn Roche IV, MD Obstetrics, Obstetrics and Gynecology  Call tomorrow to set up an appointment to be seen [address]  [address]  [place] [address]  [phone]                                       Anusha Zafar MD  05/18/20 1940

## 2020-05-20 ENCOUNTER — TELEPHONE (OUTPATIENT)
Dept: OBSTETRICS AND GYNECOLOGY | Facility: CLINIC | Age: 35
End: 2020-05-20

## 2020-05-20 NOTE — TELEPHONE ENCOUNTER
----- Message from Rosario Benoit sent at 5/20/2020 10:00 AM CDT -----  Contact: patient  Patient is giving a follow up call from her ER visit on Monday, She passed out twice.    Patient can be reached at  [phone]

## 2020-05-20 NOTE — TELEPHONE ENCOUNTER
Patient passed out straining to use have BM this weekend, she was seen ED and released. Patient also reports feeling lighted headed when standing to quickly. Patient instructed on Vaso vagal episodes in pregnancy. Patient instructed on taking Colace BID, increases fiber and water. Patient verbalized understanding.

## 2020-05-20 NOTE — TELEPHONE ENCOUNTER
Patient reports some abdominal discomforts or tenderness. Patient denies vaginal bleeding, pt concerned about her fall this weekend causing issues with pregnancy. Patient was seen in ED 5/18, u/s was performed. Patient instructed to contact office for any vaginal bleeding. Patient verbalized understanding.

## 2020-05-21 ENCOUNTER — PATIENT MESSAGE (OUTPATIENT)
Dept: OBSTETRICS AND GYNECOLOGY | Facility: CLINIC | Age: 35
End: 2020-05-21

## 2020-06-11 ENCOUNTER — ROUTINE PRENATAL (OUTPATIENT)
Dept: OBSTETRICS AND GYNECOLOGY | Facility: CLINIC | Age: 35
End: 2020-06-11
Payer: COMMERCIAL

## 2020-06-11 VITALS
DIASTOLIC BLOOD PRESSURE: 60 MMHG | BODY MASS INDEX: 20.41 KG/M2 | WEIGHT: 117.06 LBS | SYSTOLIC BLOOD PRESSURE: 100 MMHG

## 2020-06-11 DIAGNOSIS — O26.892 VAGINAL DISCHARGE DURING PREGNANCY IN SECOND TRIMESTER: ICD-10-CM

## 2020-06-11 DIAGNOSIS — O22.42 HEMORRHOIDS DURING PREGNANCY IN SECOND TRIMESTER: ICD-10-CM

## 2020-06-11 DIAGNOSIS — N89.8 VAGINAL DISCHARGE DURING PREGNANCY IN SECOND TRIMESTER: ICD-10-CM

## 2020-06-11 DIAGNOSIS — Z13.1 ENCOUNTER FOR SCREENING EXAMINATION FOR IMPAIRED GLUCOSE REGULATION AND DIABETES MELLITUS: ICD-10-CM

## 2020-06-11 DIAGNOSIS — O09.522 MULTIGRAVIDA OF ADVANCED MATERNAL AGE IN SECOND TRIMESTER: Primary | ICD-10-CM

## 2020-06-11 PROCEDURE — 0502F SUBSEQUENT PRENATAL CARE: CPT | Mod: CPTII,S$GLB,, | Performed by: OBSTETRICS & GYNECOLOGY

## 2020-06-11 PROCEDURE — 87801 DETECT AGNT MULT DNA AMPLI: CPT

## 2020-06-11 PROCEDURE — 87661 TRICHOMONAS VAGINALIS AMPLIF: CPT

## 2020-06-11 PROCEDURE — 87481 CANDIDA DNA AMP PROBE: CPT | Mod: 59

## 2020-06-11 PROCEDURE — 99999 PR PBB SHADOW E&M-EST. PATIENT-LVL III: ICD-10-PCS | Mod: PBBFAC,,, | Performed by: OBSTETRICS & GYNECOLOGY

## 2020-06-11 PROCEDURE — 99999 PR PBB SHADOW E&M-EST. PATIENT-LVL III: CPT | Mod: PBBFAC,,, | Performed by: OBSTETRICS & GYNECOLOGY

## 2020-06-11 PROCEDURE — 0502F PR SUBSEQUENT PRENATAL CARE: ICD-10-PCS | Mod: CPTII,S$GLB,, | Performed by: OBSTETRICS & GYNECOLOGY

## 2020-06-11 RX ORDER — HYDROCORTISONE ACETATE PRAMOXINE HCL 2.5; 1 G/100G; G/100G
CREAM TOPICAL 3 TIMES DAILY
Qty: 1 TUBE | Refills: 1 | Status: SHIPPED | OUTPATIENT
Start: 2020-06-11 | End: 2022-05-19

## 2020-06-11 RX ORDER — FERROUS SULFATE 325(65) MG
325 TABLET ORAL
COMMUNITY
End: 2020-12-17

## 2020-06-11 NOTE — PROGRESS NOTES
Continue routine obstetric care.  Patient instructed to initiate connected mom program.  Patient verbalized understanding.  Keep MFM/anatomy sonogram  Discomforts of pregnancy reviewed in detail   labor signs and symptoms reviewed in detail  Glucose/gestational diabetes screen at 24 weeks gestation  PCR/DNA probe done today for watery vaginal discharge with intermittent symptoms.  Speculum exam performed with no supracervical fluid leakage noted and no evidence of amniotic fluid in vaginal vault.

## 2020-06-12 ENCOUNTER — TELEPHONE (OUTPATIENT)
Dept: OBSTETRICS AND GYNECOLOGY | Facility: CLINIC | Age: 35
End: 2020-06-12

## 2020-06-12 LAB
BACTERIAL VAGINOSIS DNA: NEGATIVE
CANDIDA GLABRATA DNA: NEGATIVE
CANDIDA KRUSEI DNA: NEGATIVE
CANDIDA RRNA VAG QL PROBE: POSITIVE
T VAGINALIS RRNA GENITAL QL PROBE: NEGATIVE

## 2020-06-12 RX ORDER — TERCONAZOLE 4 MG/G
1 CREAM VAGINAL NIGHTLY
Qty: 45 G | Refills: 0 | Status: SHIPPED | OUTPATIENT
Start: 2020-06-12 | End: 2020-10-15

## 2020-06-12 NOTE — TELEPHONE ENCOUNTER
Spoke to pt. Informed pt lab came back positive for Delilah. Per NP Kameron Kim 7 will be sent to pharmacy on file. Pt verbalized understanding

## 2020-06-29 ENCOUNTER — TELEPHONE (OUTPATIENT)
Dept: PRIMARY CARE CLINIC | Facility: CLINIC | Age: 35
End: 2020-06-29

## 2020-06-29 NOTE — TELEPHONE ENCOUNTER
----- Message from Ricardo Maguire sent at 6/29/2020  7:08 AM CDT -----  Regarding: Appt  Contact: Patient [phone]  Sooner appointment than the  can schedule.  Did you offer to schedule the next available appointment and put the patient on the wait list?:    When is the first available appointment: 8/21/20  What is the nature of the appointment: leison spots on chest and arms  What visit type: EP   Patient preference of timeframe to be scheduled:    Comments: patient also wants to know what can do to spot or help the spots, stating is 20 weeks pregnant.    Please call an advise  Thank you    .

## 2020-07-02 ENCOUNTER — PROCEDURE VISIT (OUTPATIENT)
Dept: MATERNAL FETAL MEDICINE | Facility: CLINIC | Age: 35
End: 2020-07-02
Payer: COMMERCIAL

## 2020-07-02 DIAGNOSIS — Z36.89 ENCOUNTER FOR ULTRASOUND TO ASSESS FETAL GROWTH: Primary | ICD-10-CM

## 2020-07-02 DIAGNOSIS — Z36.3 ENCOUNTER FOR ANTENATAL SCREENING FOR MALFORMATION USING ULTRASOUND: ICD-10-CM

## 2020-07-02 DIAGNOSIS — O09.522 MULTIGRAVIDA OF ADVANCED MATERNAL AGE IN SECOND TRIMESTER: ICD-10-CM

## 2020-07-02 PROCEDURE — 76811 OB US DETAILED SNGL FETUS: CPT | Mod: S$GLB,,, | Performed by: OBSTETRICS & GYNECOLOGY

## 2020-07-02 PROCEDURE — 76811 PR US, OB FETAL EVAL & EXAM, TRANSABDOM,FIRST GESTATION: ICD-10-PCS | Mod: S$GLB,,, | Performed by: OBSTETRICS & GYNECOLOGY

## 2020-07-06 ENCOUNTER — PATIENT MESSAGE (OUTPATIENT)
Dept: ADMINISTRATIVE | Facility: HOSPITAL | Age: 35
End: 2020-07-06

## 2020-07-31 ENCOUNTER — PROCEDURE VISIT (OUTPATIENT)
Dept: MATERNAL FETAL MEDICINE | Facility: CLINIC | Age: 35
End: 2020-07-31
Payer: COMMERCIAL

## 2020-07-31 DIAGNOSIS — Z36.89 ENCOUNTER FOR ULTRASOUND TO ASSESS FETAL GROWTH: ICD-10-CM

## 2020-07-31 PROCEDURE — 76816 PR  US,PREGNANT UTERUS,F/U,TRANSABD APP: ICD-10-PCS | Mod: S$GLB,,, | Performed by: PEDIATRICS

## 2020-07-31 PROCEDURE — 76816 OB US FOLLOW-UP PER FETUS: CPT | Mod: S$GLB,,, | Performed by: PEDIATRICS

## 2020-08-06 ENCOUNTER — TELEPHONE (OUTPATIENT)
Dept: OBSTETRICS AND GYNECOLOGY | Facility: CLINIC | Age: 35
End: 2020-08-06

## 2020-08-06 ENCOUNTER — LAB VISIT (OUTPATIENT)
Dept: LAB | Facility: OTHER | Age: 35
End: 2020-08-06
Attending: OBSTETRICS & GYNECOLOGY
Payer: COMMERCIAL

## 2020-08-06 ENCOUNTER — ROUTINE PRENATAL (OUTPATIENT)
Dept: OBSTETRICS AND GYNECOLOGY | Facility: CLINIC | Age: 35
End: 2020-08-06
Payer: COMMERCIAL

## 2020-08-06 VITALS
SYSTOLIC BLOOD PRESSURE: 110 MMHG | DIASTOLIC BLOOD PRESSURE: 60 MMHG | WEIGHT: 132.25 LBS | BODY MASS INDEX: 23.06 KG/M2

## 2020-08-06 DIAGNOSIS — Z29.13 NEED FOR RHOGAM DUE TO RH NEGATIVE MOTHER: ICD-10-CM

## 2020-08-06 DIAGNOSIS — Z23 NEED FOR TDAP VACCINATION: ICD-10-CM

## 2020-08-06 DIAGNOSIS — Z13.1 ENCOUNTER FOR SCREENING EXAMINATION FOR IMPAIRED GLUCOSE REGULATION AND DIABETES MELLITUS: ICD-10-CM

## 2020-08-06 DIAGNOSIS — O09.522 MULTIGRAVIDA OF ADVANCED MATERNAL AGE IN SECOND TRIMESTER: Primary | ICD-10-CM

## 2020-08-06 LAB
ABO + RH BLD: NORMAL
BLD GP AB SCN CELLS X3 SERPL QL: NORMAL
GLUCOSE SERPL-MCNC: 121 MG/DL (ref 70–140)

## 2020-08-06 PROCEDURE — 99999 PR PBB SHADOW E&M-EST. PATIENT-LVL III: CPT | Mod: PBBFAC,,, | Performed by: OBSTETRICS & GYNECOLOGY

## 2020-08-06 PROCEDURE — 0502F SUBSEQUENT PRENATAL CARE: CPT | Mod: CPTII,S$GLB,, | Performed by: OBSTETRICS & GYNECOLOGY

## 2020-08-06 PROCEDURE — 86901 BLOOD TYPING SEROLOGIC RH(D): CPT

## 2020-08-06 PROCEDURE — 99999 PR PBB SHADOW E&M-EST. PATIENT-LVL III: ICD-10-PCS | Mod: PBBFAC,,, | Performed by: OBSTETRICS & GYNECOLOGY

## 2020-08-06 PROCEDURE — 0502F PR SUBSEQUENT PRENATAL CARE: ICD-10-PCS | Mod: CPTII,S$GLB,, | Performed by: OBSTETRICS & GYNECOLOGY

## 2020-08-06 PROCEDURE — 82950 GLUCOSE TEST: CPT

## 2020-08-06 NOTE — TELEPHONE ENCOUNTER
----- Message from Kary Barfield sent at 8/6/2020  8:17 AM CDT -----    Name of Who is Calling:DEON HARRIS [[id number]]      What is the request in detail: Pt would like to change appointment to August 12,2020 in between her Glucose screen . Pt state she drank a cup of coffee and wants to know can she still have Glucose reading done today Please contact to further discuss and advise    Can the clinic reply by [facility]: yes      What [facility] Back [facility]: [phone]

## 2020-08-06 NOTE — PROGRESS NOTES
Continue routine obstetric care  RhoGAM and Tdap next visit (28 weeks gestation)   labor signs and symptoms reviewed  1 hr glucose tolerance test/gestational diabetes screen ordered today.

## 2020-08-25 ENCOUNTER — TELEPHONE (OUTPATIENT)
Dept: OBSTETRICS AND GYNECOLOGY | Facility: CLINIC | Age: 35
End: 2020-08-25

## 2020-08-25 NOTE — TELEPHONE ENCOUNTER
Spoke to pt and let her know frequent urination, pains and hardening of stomach can be normal during pregnancy. Advised to try warm baths, massage to relieve symptoms. Pt denies any bleeding.. Pt states she is staying well hydrated and having normal BM. Pt verbalized understanding.

## 2020-08-25 NOTE — TELEPHONE ENCOUNTER
----- Message from Haylee Bolivar sent at 8/25/2020  8:10 AM CDT -----  Regarding: concerns  Name of Who is Calling: DEON HIDALGO [[id number]]      What is the request in detail: Patient is requesting a call back she states for the past few days her stomach has been harder than usual and also stomach pains and urinating more than usual she just has concerns she would like to address      Can the clinic reply by [facility]: no      What [facility] Back [facility]: [phone]

## 2020-09-01 ENCOUNTER — CLINICAL SUPPORT (OUTPATIENT)
Dept: OBSTETRICS AND GYNECOLOGY | Facility: CLINIC | Age: 35
End: 2020-09-01
Payer: COMMERCIAL

## 2020-09-01 ENCOUNTER — LAB VISIT (OUTPATIENT)
Dept: LAB | Facility: OTHER | Age: 35
End: 2020-09-01
Attending: NURSE PRACTITIONER
Payer: COMMERCIAL

## 2020-09-01 ENCOUNTER — ROUTINE PRENATAL (OUTPATIENT)
Dept: OBSTETRICS AND GYNECOLOGY | Facility: CLINIC | Age: 35
End: 2020-09-01
Payer: COMMERCIAL

## 2020-09-01 VITALS — SYSTOLIC BLOOD PRESSURE: 112 MMHG | DIASTOLIC BLOOD PRESSURE: 58 MMHG | BODY MASS INDEX: 24.06 KG/M2 | WEIGHT: 138 LBS

## 2020-09-01 DIAGNOSIS — O20.0 THREATENED ABORTION: ICD-10-CM

## 2020-09-01 DIAGNOSIS — N89.8 VAGINAL DISCHARGE DURING PREGNANCY IN THIRD TRIMESTER: ICD-10-CM

## 2020-09-01 DIAGNOSIS — Z29.13 NEED FOR RHOGAM DUE TO RH NEGATIVE MOTHER: ICD-10-CM

## 2020-09-01 DIAGNOSIS — Z23 NEED FOR TDAP VACCINATION: ICD-10-CM

## 2020-09-01 DIAGNOSIS — O26.893 VAGINAL DISCHARGE DURING PREGNANCY IN THIRD TRIMESTER: ICD-10-CM

## 2020-09-01 DIAGNOSIS — O09.523 MULTIGRAVIDA OF ADVANCED MATERNAL AGE IN THIRD TRIMESTER: Primary | ICD-10-CM

## 2020-09-01 PROBLEM — O22.43 HEMORRHOIDS DURING PREGNANCY IN THIRD TRIMESTER: Status: ACTIVE | Noted: 2020-06-11

## 2020-09-01 LAB — HCG INTACT+B SERPL-ACNC: NORMAL MIU/ML

## 2020-09-01 PROCEDURE — 96372 RHO (D) IMMUNE GLOBULIN: ICD-10-PCS | Mod: S$GLB,,, | Performed by: OBSTETRICS & GYNECOLOGY

## 2020-09-01 PROCEDURE — 0502F PR SUBSEQUENT PRENATAL CARE: ICD-10-PCS | Mod: CPTII,S$GLB,, | Performed by: OBSTETRICS & GYNECOLOGY

## 2020-09-01 PROCEDURE — 99999 PR PBB SHADOW E&M-EST. PATIENT-LVL I: CPT | Mod: PBBFAC,,,

## 2020-09-01 PROCEDURE — 84702 CHORIONIC GONADOTROPIN TEST: CPT

## 2020-09-01 PROCEDURE — 90471 IMMUNIZATION ADMIN: CPT | Mod: 59,S$GLB,, | Performed by: OBSTETRICS & GYNECOLOGY

## 2020-09-01 PROCEDURE — 87480 CANDIDA DNA DIR PROBE: CPT

## 2020-09-01 PROCEDURE — 87510 GARDNER VAG DNA DIR PROBE: CPT

## 2020-09-01 PROCEDURE — 0502F SUBSEQUENT PRENATAL CARE: CPT | Mod: CPTII,S$GLB,, | Performed by: OBSTETRICS & GYNECOLOGY

## 2020-09-01 PROCEDURE — 99999 PR PBB SHADOW E&M-EST. PATIENT-LVL III: ICD-10-PCS | Mod: PBBFAC,,, | Performed by: OBSTETRICS & GYNECOLOGY

## 2020-09-01 PROCEDURE — 90471 TDAP VACCINE GREATER THAN OR EQUAL TO 7YO IM: ICD-10-PCS | Mod: 59,S$GLB,, | Performed by: OBSTETRICS & GYNECOLOGY

## 2020-09-01 PROCEDURE — 99999 PR PBB SHADOW E&M-EST. PATIENT-LVL I: ICD-10-PCS | Mod: PBBFAC,,,

## 2020-09-01 PROCEDURE — 90715 TDAP VACCINE 7 YRS/> IM: CPT | Mod: S$GLB,,, | Performed by: OBSTETRICS & GYNECOLOGY

## 2020-09-01 PROCEDURE — 99999 PR PBB SHADOW E&M-EST. PATIENT-LVL III: CPT | Mod: PBBFAC,,, | Performed by: OBSTETRICS & GYNECOLOGY

## 2020-09-01 PROCEDURE — 36415 COLL VENOUS BLD VENIPUNCTURE: CPT

## 2020-09-01 PROCEDURE — 96372 THER/PROPH/DIAG INJ SC/IM: CPT | Mod: S$GLB,,, | Performed by: OBSTETRICS & GYNECOLOGY

## 2020-09-01 PROCEDURE — 90715 TDAP VACCINE GREATER THAN OR EQUAL TO 7YO IM: ICD-10-PCS | Mod: S$GLB,,, | Performed by: OBSTETRICS & GYNECOLOGY

## 2020-09-01 RX ORDER — TERCONAZOLE 4 MG/G
CREAM VAGINAL
COMMUNITY
Start: 2020-06-12 | End: 2020-10-15

## 2020-09-01 NOTE — PROGRESS NOTES
Continue routine obstetric care/connected mom program  Tdap and RhoGAM today   labor signs and symptoms reviewed  Patient counseled on fetal movement counts to be initiated at 30-32 weeks gestation.    Patient reported vaginal discharge.  Patient also desired cervical examination.  Speculum exam performed with minimal discharge being noted.  There was no evidence of fluid per cervical os and no fluid in the vaginal vault.  An affirm test/DNA probe was done due to patient's symptoms.

## 2020-09-01 NOTE — PROGRESS NOTES
Here for TDAP injection. Patient without complaint of pain at this time, Injection given. Tolerated well. No pain noted after injection.  Advised to wait in lobby 15 minutes and report any adverse reactions.     Site: [facility]    Baby Friendly Handout Given to Patient          Here for rhogam injection, no complaints at this time. Verified patient is RH negative. No complaint of pain before or after injection. Patient advised to wait 15 minutes before leaving and report any adverse reactions.    Site: [facility]

## 2020-09-02 LAB
CANDIDA RRNA VAG QL PROBE: NEGATIVE
G VAGINALIS RRNA GENITAL QL PROBE: NEGATIVE
T VAGINALIS RRNA GENITAL QL PROBE: NEGATIVE

## 2020-09-24 ENCOUNTER — PROCEDURE VISIT (OUTPATIENT)
Dept: MATERNAL FETAL MEDICINE | Facility: CLINIC | Age: 35
End: 2020-09-24
Payer: COMMERCIAL

## 2020-09-24 DIAGNOSIS — Z36.89 ENCOUNTER FOR ULTRASOUND TO ASSESS FETAL GROWTH: Primary | ICD-10-CM

## 2020-09-24 DIAGNOSIS — Z36.89 ENCOUNTER FOR ULTRASOUND TO ASSESS FETAL GROWTH: ICD-10-CM

## 2020-09-24 PROCEDURE — 76817 PR US, OB, TRANSVAG APPROACH: ICD-10-PCS | Mod: S$GLB,,, | Performed by: OBSTETRICS & GYNECOLOGY

## 2020-09-24 PROCEDURE — 76816 PR  US,PREGNANT UTERUS,F/U,TRANSABD APP: ICD-10-PCS | Mod: S$GLB,,, | Performed by: OBSTETRICS & GYNECOLOGY

## 2020-09-24 PROCEDURE — 76816 OB US FOLLOW-UP PER FETUS: CPT | Mod: S$GLB,,, | Performed by: OBSTETRICS & GYNECOLOGY

## 2020-09-24 PROCEDURE — 76817 TRANSVAGINAL US OBSTETRIC: CPT | Mod: S$GLB,,, | Performed by: OBSTETRICS & GYNECOLOGY

## 2020-10-09 ENCOUNTER — ROUTINE PRENATAL (OUTPATIENT)
Dept: OBSTETRICS AND GYNECOLOGY | Facility: CLINIC | Age: 35
End: 2020-10-09
Payer: COMMERCIAL

## 2020-10-09 ENCOUNTER — LAB VISIT (OUTPATIENT)
Dept: LAB | Facility: OTHER | Age: 35
End: 2020-10-09
Attending: NURSE PRACTITIONER
Payer: COMMERCIAL

## 2020-10-09 ENCOUNTER — PATIENT MESSAGE (OUTPATIENT)
Dept: ADMINISTRATIVE | Facility: HOSPITAL | Age: 35
End: 2020-10-09

## 2020-10-09 VITALS — WEIGHT: 143.94 LBS | SYSTOLIC BLOOD PRESSURE: 102 MMHG | BODY MASS INDEX: 25.1 KG/M2 | DIASTOLIC BLOOD PRESSURE: 64 MMHG

## 2020-10-09 DIAGNOSIS — L29.9 ITCHING: ICD-10-CM

## 2020-10-09 DIAGNOSIS — Z34.80 SUPERVISION OF OTHER NORMAL PREGNANCY, ANTEPARTUM: ICD-10-CM

## 2020-10-09 DIAGNOSIS — O92.5 LACTATING, SUPPRESSED: ICD-10-CM

## 2020-10-09 DIAGNOSIS — Z34.80 SUPERVISION OF OTHER NORMAL PREGNANCY, ANTEPARTUM: Primary | ICD-10-CM

## 2020-10-09 LAB
ALBUMIN SERPL BCP-MCNC: 2.8 G/DL (ref 3.5–5.2)
ALP SERPL-CCNC: 164 U/L (ref 55–135)
ALT SERPL W/O P-5'-P-CCNC: 7 U/L (ref 10–44)
ANION GAP SERPL CALC-SCNC: 11 MMOL/L (ref 8–16)
AST SERPL-CCNC: 11 U/L (ref 10–40)
BILIRUB SERPL-MCNC: 0.2 MG/DL (ref 0.1–1)
BUN SERPL-MCNC: 7 MG/DL (ref 6–20)
CALCIUM SERPL-MCNC: 8.9 MG/DL (ref 8.7–10.5)
CHLORIDE SERPL-SCNC: 109 MMOL/L (ref 95–110)
CO2 SERPL-SCNC: 17 MMOL/L (ref 23–29)
CREAT SERPL-MCNC: 0.5 MG/DL (ref 0.5–1.4)
EST. GFR  (AFRICAN AMERICAN): >60 ML/MIN/1.73 M^2
EST. GFR  (NON AFRICAN AMERICAN): >60 ML/MIN/1.73 M^2
GLUCOSE SERPL-MCNC: 81 MG/DL (ref 70–110)
POTASSIUM SERPL-SCNC: 3.8 MMOL/L (ref 3.5–5.1)
PROT SERPL-MCNC: 6.6 G/DL (ref 6–8.4)
SODIUM SERPL-SCNC: 137 MMOL/L (ref 136–145)

## 2020-10-09 PROCEDURE — 82239 BILE ACIDS TOTAL: CPT

## 2020-10-09 PROCEDURE — 0502F SUBSEQUENT PRENATAL CARE: CPT | Mod: CPTII,S$GLB,, | Performed by: NURSE PRACTITIONER

## 2020-10-09 PROCEDURE — 0502F PR SUBSEQUENT PRENATAL CARE: ICD-10-PCS | Mod: CPTII,S$GLB,, | Performed by: NURSE PRACTITIONER

## 2020-10-09 PROCEDURE — 99999 PR PBB SHADOW E&M-EST. PATIENT-LVL III: ICD-10-PCS | Mod: PBBFAC,,, | Performed by: NURSE PRACTITIONER

## 2020-10-09 PROCEDURE — 80053 COMPREHEN METABOLIC PANEL: CPT

## 2020-10-09 PROCEDURE — 36415 COLL VENOUS BLD VENIPUNCTURE: CPT

## 2020-10-09 PROCEDURE — 99999 PR PBB SHADOW E&M-EST. PATIENT-LVL III: CPT | Mod: PBBFAC,,, | Performed by: NURSE PRACTITIONER

## 2020-10-09 NOTE — PROGRESS NOTES
Here for routine OB appt at 34w1d, with complaints of itching all over her body.  Discussed use of emollient lotions such as Eucerin or Lubriderm.  CMP and bile acids for eval.   Reports good FM.  Denies LOF, denies VB, and reports irregular contractions.  Reviewed warning signs of Labor and Preeclampsia.  Daily FM counts reinforced.  Peds- Dr. Oates.  Plans to breastfeed- RX for pump given.   Declines flu vaccine.  MFM scan on 10/22/20 for f/u growth (AC 10 th percentile)  F/U scheduled 1 week

## 2020-10-10 ENCOUNTER — TELEPHONE (OUTPATIENT)
Dept: OBSTETRICS AND GYNECOLOGY | Facility: HOSPITAL | Age: 35
End: 2020-10-10

## 2020-10-10 ENCOUNTER — PATIENT MESSAGE (OUTPATIENT)
Dept: OBSTETRICS AND GYNECOLOGY | Facility: CLINIC | Age: 35
End: 2020-10-10

## 2020-10-10 NOTE — TELEPHONE ENCOUNTER
Patient called RN station reporting she was worried because she is having generalized itching, had labs drawn last week, and sees that come of her values are abnormal.   CMP reviewed - alk Phos elevated 160. Liver enzymes WNL.   Bile acids pending.   Patient reports itching is mild and has negative ROS otherwise.   Reviewed warning signs of Labor and Preeclampsia.  Daily FM counts reinforced.  Will have f/u with primary OB and counseled to come to OB ED if needed.     Amado Stoddard M.D., PGY4  Obstetrics & Gynecology

## 2020-10-12 ENCOUNTER — PATIENT MESSAGE (OUTPATIENT)
Dept: OBSTETRICS AND GYNECOLOGY | Facility: CLINIC | Age: 35
End: 2020-10-12

## 2020-10-12 LAB — BILE AC SERPL-SCNC: 2 MCMOL/L

## 2020-10-14 ENCOUNTER — PATIENT MESSAGE (OUTPATIENT)
Dept: OPTOMETRY | Facility: CLINIC | Age: 35
End: 2020-10-14

## 2020-10-15 ENCOUNTER — ROUTINE PRENATAL (OUTPATIENT)
Dept: OBSTETRICS AND GYNECOLOGY | Facility: CLINIC | Age: 35
End: 2020-10-15
Payer: COMMERCIAL

## 2020-10-15 ENCOUNTER — TELEPHONE (OUTPATIENT)
Dept: OBSTETRICS AND GYNECOLOGY | Facility: CLINIC | Age: 35
End: 2020-10-15

## 2020-10-15 ENCOUNTER — LAB VISIT (OUTPATIENT)
Dept: LAB | Facility: OTHER | Age: 35
End: 2020-10-15
Attending: OBSTETRICS & GYNECOLOGY
Payer: COMMERCIAL

## 2020-10-15 VITALS
DIASTOLIC BLOOD PRESSURE: 58 MMHG | BODY MASS INDEX: 25.14 KG/M2 | WEIGHT: 144.19 LBS | SYSTOLIC BLOOD PRESSURE: 102 MMHG

## 2020-10-15 DIAGNOSIS — O09.523 SUPERVISION OF ELDERLY MULTIGRAVIDA IN THIRD TRIMESTER: ICD-10-CM

## 2020-10-15 DIAGNOSIS — Z36.85 ANTENATAL SCREENING FOR STREPTOCOCCUS B: ICD-10-CM

## 2020-10-15 DIAGNOSIS — O09.523 SUPERVISION OF ELDERLY MULTIGRAVIDA IN THIRD TRIMESTER: Primary | ICD-10-CM

## 2020-10-15 DIAGNOSIS — Z23 NEED FOR PROPHYLACTIC VACCINATION AND INOCULATION AGAINST INFLUENZA: ICD-10-CM

## 2020-10-15 DIAGNOSIS — Z01.818 PREOPERATIVE TESTING: Primary | ICD-10-CM

## 2020-10-15 DIAGNOSIS — Z34.90 ENCOUNTER FOR INDUCTION OF LABOR: ICD-10-CM

## 2020-10-15 LAB
BASOPHILS # BLD AUTO: 0.05 K/UL (ref 0–0.2)
BASOPHILS NFR BLD: 0.4 % (ref 0–1.9)
DIFFERENTIAL METHOD: ABNORMAL
EOSINOPHIL # BLD AUTO: 0.2 K/UL (ref 0–0.5)
EOSINOPHIL NFR BLD: 1.5 % (ref 0–8)
ERYTHROCYTE [DISTWIDTH] IN BLOOD BY AUTOMATED COUNT: 12.7 % (ref 11.5–14.5)
HCT VFR BLD AUTO: 34.3 % (ref 37–48.5)
HGB BLD-MCNC: 11.1 G/DL (ref 12–16)
IMM GRANULOCYTES # BLD AUTO: 0.08 K/UL (ref 0–0.04)
IMM GRANULOCYTES NFR BLD AUTO: 0.6 % (ref 0–0.5)
LYMPHOCYTES # BLD AUTO: 2.5 K/UL (ref 1–4.8)
LYMPHOCYTES NFR BLD: 18.1 % (ref 18–48)
MCH RBC QN AUTO: 31.4 PG (ref 27–31)
MCHC RBC AUTO-ENTMCNC: 32.4 G/DL (ref 32–36)
MCV RBC AUTO: 97 FL (ref 82–98)
MONOCYTES # BLD AUTO: 1.2 K/UL (ref 0.3–1)
MONOCYTES NFR BLD: 8.5 % (ref 4–15)
NEUTROPHILS # BLD AUTO: 9.7 K/UL (ref 1.8–7.7)
NEUTROPHILS NFR BLD: 70.9 % (ref 38–73)
NRBC BLD-RTO: 0 /100 WBC
PLATELET # BLD AUTO: 451 K/UL (ref 150–350)
PMV BLD AUTO: 10.5 FL (ref 9.2–12.9)
RBC # BLD AUTO: 3.54 M/UL (ref 4–5.4)
WBC # BLD AUTO: 13.7 K/UL (ref 3.9–12.7)

## 2020-10-15 PROCEDURE — 87081 CULTURE SCREEN ONLY: CPT

## 2020-10-15 PROCEDURE — 0502F SUBSEQUENT PRENATAL CARE: CPT | Mod: CPTII,S$GLB,, | Performed by: OBSTETRICS & GYNECOLOGY

## 2020-10-15 PROCEDURE — 86703 HIV-1/HIV-2 1 RESULT ANTBDY: CPT

## 2020-10-15 PROCEDURE — 86592 SYPHILIS TEST NON-TREP QUAL: CPT

## 2020-10-15 PROCEDURE — 36415 COLL VENOUS BLD VENIPUNCTURE: CPT

## 2020-10-15 PROCEDURE — 85025 COMPLETE CBC W/AUTO DIFF WBC: CPT

## 2020-10-15 PROCEDURE — 99999 PR PBB SHADOW E&M-EST. PATIENT-LVL III: CPT | Mod: PBBFAC,,, | Performed by: OBSTETRICS & GYNECOLOGY

## 2020-10-15 PROCEDURE — 0502F PR SUBSEQUENT PRENATAL CARE: ICD-10-PCS | Mod: CPTII,S$GLB,, | Performed by: OBSTETRICS & GYNECOLOGY

## 2020-10-15 PROCEDURE — 99999 PR PBB SHADOW E&M-EST. PATIENT-LVL III: ICD-10-PCS | Mod: PBBFAC,,, | Performed by: OBSTETRICS & GYNECOLOGY

## 2020-10-16 ENCOUNTER — IMMUNIZATION (OUTPATIENT)
Dept: PHARMACY | Facility: CLINIC | Age: 35
End: 2020-10-16
Payer: COMMERCIAL

## 2020-10-16 LAB
HIV 1+2 AB+HIV1 P24 AG SERPL QL IA: NEGATIVE
RPR SER QL: NORMAL

## 2020-10-19 LAB — BACTERIA SPEC AEROBE CULT: NORMAL

## 2020-10-22 ENCOUNTER — PROCEDURE VISIT (OUTPATIENT)
Dept: MATERNAL FETAL MEDICINE | Facility: CLINIC | Age: 35
End: 2020-10-22
Payer: COMMERCIAL

## 2020-10-22 ENCOUNTER — OFFICE VISIT (OUTPATIENT)
Dept: MATERNAL FETAL MEDICINE | Facility: CLINIC | Age: 35
End: 2020-10-22
Payer: COMMERCIAL

## 2020-10-22 DIAGNOSIS — O36.5930 INTRAUTERINE GROWTH RESTRICTION (IUGR) AFFECTING CARE OF MOTHER, THIRD TRIMESTER, SINGLE GESTATION: Primary | ICD-10-CM

## 2020-10-22 DIAGNOSIS — O09.523 MULTIGRAVIDA OF ADVANCED MATERNAL AGE IN THIRD TRIMESTER: Primary | ICD-10-CM

## 2020-10-22 DIAGNOSIS — Z36.89 ENCOUNTER FOR ULTRASOUND TO ASSESS FETAL GROWTH: ICD-10-CM

## 2020-10-22 DIAGNOSIS — O36.5930 INTRAUTERINE GROWTH RESTRICTION (IUGR) AFFECTING CARE OF MOTHER, THIRD TRIMESTER, SINGLE GESTATION: ICD-10-CM

## 2020-10-22 PROCEDURE — 76820 PR US, OB DOPPLER, FETAL UMBILICAL ARTERY ECHO: ICD-10-PCS | Mod: S$GLB,,, | Performed by: OBSTETRICS & GYNECOLOGY

## 2020-10-22 PROCEDURE — 99213 OFFICE O/P EST LOW 20 MIN: CPT | Mod: 25,S$GLB,, | Performed by: OBSTETRICS & GYNECOLOGY

## 2020-10-22 PROCEDURE — 76820 UMBILICAL ARTERY ECHO: CPT | Mod: S$GLB,,, | Performed by: OBSTETRICS & GYNECOLOGY

## 2020-10-22 PROCEDURE — 76816 PR  US,PREGNANT UTERUS,F/U,TRANSABD APP: ICD-10-PCS | Mod: S$GLB,,, | Performed by: OBSTETRICS & GYNECOLOGY

## 2020-10-22 PROCEDURE — 76816 OB US FOLLOW-UP PER FETUS: CPT | Mod: S$GLB,,, | Performed by: OBSTETRICS & GYNECOLOGY

## 2020-10-22 PROCEDURE — 76819 PR US, OB, FETAL BIOPHYSICAL, W/O NST: ICD-10-PCS | Mod: S$GLB,,, | Performed by: OBSTETRICS & GYNECOLOGY

## 2020-10-22 PROCEDURE — 76819 FETAL BIOPHYS PROFIL W/O NST: CPT | Mod: S$GLB,,, | Performed by: OBSTETRICS & GYNECOLOGY

## 2020-10-22 PROCEDURE — 99213 PR OFFICE/OUTPT VISIT, EST, LEVL III, 20-29 MIN: ICD-10-PCS | Mod: 25,S$GLB,, | Performed by: OBSTETRICS & GYNECOLOGY

## 2020-10-22 NOTE — PROGRESS NOTES
Dear Roseanna,    Thank you for referring your patient, Cass Shipley, to be evaluated in the [facility] clinic. As you know, she is a 35 y.o.  with IUP that we followed with at 36w0d. Cass's pregnancy is complicated by tobacco use. She came today for a follow up growth scan, at which time severe IUGR was found.  This is Cass's third pregnancy. She has history of 1 miscarriage.  Her previous delivery was in , that coming at 40 weeks gestation after induction and delivering a 6 lb .  This pregnancy has been otherwise uncomplicated.    Past Medical History:  No date: Abnormal Pap smear  No date: Allergy  No date: Anxiety  No date: GERD (gastroesophageal reflux disease)  No date: Hemorrhoids without complication  No date: Supraventricular tachycardia    Current Outpatient Medications:     docusate sodium (COLACE) 50 MG capsule, Take by mouth 2 (two) times daily., Disp: , Rfl:     ferrous sulfate (FEOSOL) 325 mg (65 mg iron) Tab tablet, Take 325 mg by mouth daily with breakfast., Disp: , Rfl:     hydrocortisone-pramoxine (ANALPRAM-HC) 2.5-1 % Crea, Place rectally 3 (three) times daily. (Patient not taking: Reported on 10/15/2020), Disp: 1 Tube, Rfl: 1    prenatal vit-iron fum-folic ac (PRENATAL TABLET) 28 mg iron- 800 mcg Tab, Take 1 tablet by mouth once daily., Disp: 30 tablet, Rfl: 12  Past surgical history: Cass  has a past surgical history that includes Cervical biopsy w/ loop electrode excision and Vaginal delivery.  Family history: Neither Cass nor Randell are aware of any family history of birth defect, chromosomal anomaly or syndromic disorder. Cass is not aware of any family history of preeclampsia, diabetes or blood clots.  Social history: Cass  reports that she has been smoking. She has a 3.00 pack-year smoking history. She has never used smokeless tobacco. She reports current alcohol use of about 2.0 standard drinks of alcohol per week. She reports that she does not use  drugs.  Allergies: Vicodin (itching)    Objective:  Last menstrual period 2020.  Pregravid BMI 19.60    US: (10/22/2020) Viable IUP at 36w0d with fetal growth restriction at the estimated fetal weight of the 3rd percentile.  Abdominal circumference is at the 5th percentile.  Amniotic fluid volume and umbilical artery Dopplers are normal, though the ST ratio of the UA Dopplers is at the 95th percentile, just below elevated.    Assessment & Recommendations:  Cass Shipley is a 35 y.o.  at 36w0d with IUP complicated by    Intrauterine growth restriction (IUGR) affecting care of mother, third trimester, single gestation  Severe FGR diagnosed today at 36w0d, EFW 3%, AC 1%. Normal BPP with high-normal SD ratio on UA dopplers. The amniotic fluid volume is normal. Ultrasound demonstrated no abnormalities. Potential etiologies of IUGR include but are not limited to normal variation of stature, placental insufficiency, chromosomal abnormalities, genetic disorders, infections, medical conditions, teratogen exposure and other etiologies.  We discussed the need for earlier delivery.  Recommendations:  - Follow up UA dopplers on Monday  - Start weekly BPPs Monday to alternate with weekly NST until delivery  - Recommend delivery at 37w0d to 37w6d unless indication for earlier delivery arises      Thank you for the opportunity to participate in the care of Cass. Please let us know of any questions or concerns.  Today I spent 15 minutes with Cass face to face, over 50% of which were dedicated to counseling and coordination of care.      Sincerely,      DONELL Bolivar MD/MPH  Maternal Fetal Medicine

## 2020-10-22 NOTE — ASSESSMENT & PLAN NOTE
Severe FGR diagnosed today at 36w0d, EFW 3%, AC 1%. Normal BPP with high-normal SD ratio on UA dopplers. The amniotic fluid volume is normal. Ultrasound demonstrated no abnormalities. Potential etiologies of IUGR include but are not limited to normal variation of stature, placental insufficiency, chromosomal abnormalities, genetic disorders, infections, medical conditions, teratogen exposure and other etiologies.  We discussed the need for earlier delivery.  Recommendations:  - Follow up UA dopplers on Monday  - Start weekly BPPs Monday to alternate with weekly NST until delivery  - Recommend delivery at 37w0d to 37w6d unless indication for earlier delivery arises

## 2020-10-26 ENCOUNTER — OFFICE VISIT (OUTPATIENT)
Dept: MATERNAL FETAL MEDICINE | Facility: CLINIC | Age: 35
End: 2020-10-26
Payer: COMMERCIAL

## 2020-10-26 ENCOUNTER — PROCEDURE VISIT (OUTPATIENT)
Dept: MATERNAL FETAL MEDICINE | Facility: CLINIC | Age: 35
End: 2020-10-26
Payer: COMMERCIAL

## 2020-10-26 ENCOUNTER — HOSPITAL ENCOUNTER (OUTPATIENT)
Dept: PREADMISSION TESTING | Facility: OTHER | Age: 35
Discharge: HOME | End: 2020-10-26
Attending: OBSTETRICS & GYNECOLOGY
Payer: COMMERCIAL

## 2020-10-26 DIAGNOSIS — O36.5930 INTRAUTERINE GROWTH RESTRICTION (IUGR) AFFECTING CARE OF MOTHER, THIRD TRIMESTER, SINGLE GESTATION: Primary | ICD-10-CM

## 2020-10-26 DIAGNOSIS — O36.5930 INTRAUTERINE GROWTH RESTRICTION (IUGR) AFFECTING CARE OF MOTHER, THIRD TRIMESTER, SINGLE GESTATION: ICD-10-CM

## 2020-10-26 DIAGNOSIS — Z01.818 PREOPERATIVE TESTING: ICD-10-CM

## 2020-10-26 LAB — SARS-COV-2 RNA RESP QL NAA+PROBE: NOT DETECTED

## 2020-10-26 PROCEDURE — 99212 PR OFFICE/OUTPT VISIT, EST, LEVL II, 10-19 MIN: ICD-10-PCS | Mod: 25,S$GLB,, | Performed by: OBSTETRICS & GYNECOLOGY

## 2020-10-26 PROCEDURE — 76819 FETAL BIOPHYS PROFIL W/O NST: CPT | Mod: S$GLB,,, | Performed by: OBSTETRICS & GYNECOLOGY

## 2020-10-26 PROCEDURE — 76819 PR US, OB, FETAL BIOPHYSICAL, W/O NST: ICD-10-PCS | Mod: S$GLB,,, | Performed by: OBSTETRICS & GYNECOLOGY

## 2020-10-26 PROCEDURE — 99212 OFFICE O/P EST SF 10 MIN: CPT | Mod: 25,S$GLB,, | Performed by: OBSTETRICS & GYNECOLOGY

## 2020-10-26 PROCEDURE — 76820 UMBILICAL ARTERY ECHO: CPT | Mod: S$GLB,,, | Performed by: OBSTETRICS & GYNECOLOGY

## 2020-10-26 PROCEDURE — U0003 INFECTIOUS AGENT DETECTION BY NUCLEIC ACID (DNA OR RNA); SEVERE ACUTE RESPIRATORY SYNDROME CORONAVIRUS 2 (SARS-COV-2) (CORONAVIRUS DISEASE [COVID-19]), AMPLIFIED PROBE TECHNIQUE, MAKING USE OF HIGH THROUGHPUT TECHNOLOGIES AS DESCRIBED BY CMS-2020-01-R: HCPCS

## 2020-10-26 PROCEDURE — 76820 PR US, OB DOPPLER, FETAL UMBILICAL ARTERY ECHO: ICD-10-PCS | Mod: S$GLB,,, | Performed by: OBSTETRICS & GYNECOLOGY

## 2020-10-26 NOTE — PROGRESS NOTES
Dear Roseanna,     Thank you for referring your patient, Cass Shipley, to be seen in the [facility] clinic. As you know, she is a 35 y.o.  with IUP with whom we followed today at 36w4d. Cass's pregnancy is complicated by severe fetal growth restriction.   Cass followed today for repeat Dopplers and BPP.  Values are similar to what we saw on Thursday, though all are still technically normal including maximum vertical pocket and systolic to diastolic umbilical artery Doppler ratios.  BPP is 8/8.  She does report decreased size of fetal movements, though she does report normal frequency as she has been following regular kick counts.  She reports high anxiety throughout the weekend and carrying through today.      Current Outpatient Medications:     docusate sodium (COLACE) 50 MG capsule, Take by mouth 2 (two) times daily., Disp: , Rfl:     ferrous sulfate (FEOSOL) 325 mg (65 mg iron) Tab tablet, Take 325 mg by mouth daily with breakfast., Disp: , Rfl:     hydrocortisone-pramoxine (ANALPRAM-HC) 2.5-1 % Crea, Place rectally 3 (three) times daily. (Patient not taking: Reported on 10/15/2020), Disp: 1 Tube, Rfl: 1    prenatal vit-iron fum-folic ac (PRENATAL TABLET) 28 mg iron- 800 mcg Tab, Take 1 tablet by mouth once daily., Disp: 30 tablet, Rfl: 12    Objective:  Last menstrual period 2020.  Pregravid BMI 19.60  General:  Well-appearing woman in her 3rd trimester pregnancy    US: (10/26/2020) The BPP score is reassuring at 8/8. UA doppler S/D ratios are normal.  MVP is normal.    Assessment & Recommendations:  Cass Shipley is a 35 y.o.  at 36w4d with IUP complicated by    Intrauterine growth restriction (IUGR) affecting care of mother, third trimester, single gestation   Severe FGR diagnosed at 36w0d, EFW 3%, AC 1%. Normal BPP with high-normal SD ratio on UA dopplers.   Today BPP is reassuring and umbilical artery Dopplers are within normal limits.  Maximum vertical pocket is likewise normal.  Testing  is reassuring.  Recommendations:  1.  Continue fetal monitoring as previously outlined  2.  Given patient reported decreased fetal movement, can consider fetal monitoring via NST  3.  In the absence of nonreassuring testing, plan for 37 week delivery.      Thank you for the opportunity to participate in the care of Cass. Please let us know of any questions or concerns.  Today I spent 10 minutes with Cass face to face, over 50% of which were dedicated to counseling and coordination of care.    Sincerely,      DONELL Bolivar MD/MPH  Maternal Fetal Medicine

## 2020-10-26 NOTE — ASSESSMENT & PLAN NOTE
Severe FGR diagnosed at 36w0d, EFW 3%, AC 1%. Normal BPP with high-normal SD ratio on UA dopplers.   Today BPP is reassuring and umbilical artery Dopplers are within normal limits.  Maximum vertical pocket is likewise normal.  Testing is reassuring.  Recommendations:  1.  Continue fetal monitoring as previously outlined  2.  Given patient reported decreased fetal movement, can consider fetal monitoring via NST  3.  In the absence of nonreassuring testing, plan for 37 week delivery.

## 2020-10-28 ENCOUNTER — TELEPHONE (OUTPATIENT)
Dept: OBSTETRICS AND GYNECOLOGY | Facility: CLINIC | Age: 35
End: 2020-10-28

## 2020-10-28 NOTE — TELEPHONE ENCOUNTER
Spoke to pt. Pt wanted us to know L&D phoned and may have her come in earlier for induction. Informed pt to follow instructions from that department and we will be notified. Pt verbalized understanding

## 2020-10-28 NOTE — TELEPHONE ENCOUNTER
----- Message from Herberth Guzman sent at 10/28/2020  8:43 AM CDT -----  Please call ob pt [phone]

## 2020-10-29 ENCOUNTER — ANESTHESIA (OUTPATIENT)
Dept: OBSTETRICS AND GYNECOLOGY | Facility: OTHER | Age: 35
End: 2020-10-29
Payer: COMMERCIAL

## 2020-10-29 ENCOUNTER — HOSPITAL ENCOUNTER (INPATIENT)
Facility: OTHER | Age: 35
LOS: 2 days | Discharge: HOME | End: 2020-10-31
Attending: OBSTETRICS & GYNECOLOGY | Admitting: OBSTETRICS & GYNECOLOGY
Payer: COMMERCIAL

## 2020-10-29 ENCOUNTER — ANESTHESIA EVENT (OUTPATIENT)
Dept: OBSTETRICS AND GYNECOLOGY | Facility: OTHER | Age: 35
End: 2020-10-29
Payer: COMMERCIAL

## 2020-10-29 DIAGNOSIS — Z34.90 ENCOUNTER FOR INDUCTION OF LABOR: ICD-10-CM

## 2020-10-29 LAB
ABO + RH BLD: NORMAL
BASOPHILS # BLD AUTO: 0.07 K/UL (ref 0–0.2)
BASOPHILS NFR BLD: 0.5 % (ref 0–1.9)
BLD GP AB SCN CELLS X3 SERPL QL: NORMAL
BLOOD GROUP ANTIBODIES SERPL: NORMAL
DIFFERENTIAL METHOD: ABNORMAL
EOSINOPHIL # BLD AUTO: 0.2 K/UL (ref 0–0.5)
EOSINOPHIL NFR BLD: 1.1 % (ref 0–8)
ERYTHROCYTE [DISTWIDTH] IN BLOOD BY AUTOMATED COUNT: 12.6 % (ref 11.5–14.5)
HCT VFR BLD AUTO: 36.6 % (ref 37–48.5)
HGB BLD-MCNC: 11.9 G/DL (ref 12–16)
IMM GRANULOCYTES # BLD AUTO: 0.08 K/UL (ref 0–0.04)
IMM GRANULOCYTES NFR BLD AUTO: 0.5 % (ref 0–0.5)
LYMPHOCYTES # BLD AUTO: 2.3 K/UL (ref 1–4.8)
LYMPHOCYTES NFR BLD: 15.1 % (ref 18–48)
MCH RBC QN AUTO: 31.1 PG (ref 27–31)
MCHC RBC AUTO-ENTMCNC: 32.5 G/DL (ref 32–36)
MCV RBC AUTO: 96 FL (ref 82–98)
MONOCYTES # BLD AUTO: 1.1 K/UL (ref 0.3–1)
MONOCYTES NFR BLD: 7.1 % (ref 4–15)
NEUTROPHILS # BLD AUTO: 11.4 K/UL (ref 1.8–7.7)
NEUTROPHILS NFR BLD: 75.7 % (ref 38–73)
NRBC BLD-RTO: 0 /100 WBC
PLATELET # BLD AUTO: 453 K/UL (ref 150–350)
PMV BLD AUTO: 10.2 FL (ref 9.2–12.9)
RBC # BLD AUTO: 3.83 M/UL (ref 4–5.4)
WBC # BLD AUTO: 15.1 K/UL (ref 3.9–12.7)

## 2020-10-29 PROCEDURE — 27000181 HC CABLE, IUPC

## 2020-10-29 PROCEDURE — 59200 INSERT CERVICAL DILATOR: CPT

## 2020-10-29 PROCEDURE — 62326 NJX INTERLAMINAR LMBR/SAC: CPT | Performed by: STUDENT IN AN ORGANIZED HEALTH CARE EDUCATION/TRAINING PROGRAM

## 2020-10-29 PROCEDURE — 86870 RBC ANTIBODY IDENTIFICATION: CPT

## 2020-10-29 PROCEDURE — 59400 OBSTETRICAL CARE: CPT | Mod: AT,,, | Performed by: OBSTETRICS & GYNECOLOGY

## 2020-10-29 PROCEDURE — 11000001 HC ACUTE MED/SURG PRIVATE ROOM

## 2020-10-29 PROCEDURE — 63600175 PHARM REV CODE 636 W HCPCS: Performed by: STUDENT IN AN ORGANIZED HEALTH CARE EDUCATION/TRAINING PROGRAM

## 2020-10-29 PROCEDURE — 86850 RBC ANTIBODY SCREEN: CPT

## 2020-10-29 PROCEDURE — 51702 INSERT TEMP BLADDER CATH: CPT

## 2020-10-29 PROCEDURE — 59400 PR FULL ROUT OBSTE CARE,VAGINAL DELIV: ICD-10-PCS | Mod: AT,,, | Performed by: OBSTETRICS & GYNECOLOGY

## 2020-10-29 PROCEDURE — 85025 COMPLETE CBC W/AUTO DIFF WBC: CPT

## 2020-10-29 PROCEDURE — 59400 PRA FULL ROUT OBSTE CARE,VAGINAL DELIV: ICD-10-PCS | Mod: QY,,, | Performed by: ANESTHESIOLOGY

## 2020-10-29 PROCEDURE — 72100003 HC LABOR CARE, EA. ADDL. 8 HRS

## 2020-10-29 PROCEDURE — 88307 TISSUE EXAM BY PATHOLOGIST: CPT | Performed by: PATHOLOGY

## 2020-10-29 PROCEDURE — 25000003 PHARM REV CODE 250: Performed by: STUDENT IN AN ORGANIZED HEALTH CARE EDUCATION/TRAINING PROGRAM

## 2020-10-29 PROCEDURE — 88307 TISSUE EXAM BY PATHOLOGIST: CPT | Mod: 26,,, | Performed by: PATHOLOGY

## 2020-10-29 PROCEDURE — 88307 PR  SURG PATH,LEVEL V: ICD-10-PCS | Mod: 26,,, | Performed by: PATHOLOGY

## 2020-10-29 PROCEDURE — C1751 CATH, INF, PER/CENT/MIDLINE: HCPCS | Performed by: ANESTHESIOLOGY

## 2020-10-29 PROCEDURE — 25000003 PHARM REV CODE 250

## 2020-10-29 PROCEDURE — 27200710 HC EPIDURAL INFUSION PUMP SET: Performed by: ANESTHESIOLOGY

## 2020-10-29 PROCEDURE — 72100002 HC LABOR CARE, 1ST 8 HOURS

## 2020-10-29 PROCEDURE — 59400 OBSTETRICAL CARE: CPT | Mod: QY,,, | Performed by: ANESTHESIOLOGY

## 2020-10-29 RX ORDER — SODIUM CHLORIDE 9 MG/ML
INJECTION, SOLUTION INTRAVENOUS
Status: DISCONTINUED | OUTPATIENT
Start: 2020-10-29 | End: 2020-10-29

## 2020-10-29 RX ORDER — CALCIUM CARBONATE 200(500)MG
500 TABLET,CHEWABLE ORAL 3 TIMES DAILY PRN
Status: DISCONTINUED | OUTPATIENT
Start: 2020-10-29 | End: 2020-10-29

## 2020-10-29 RX ORDER — DOCUSATE SODIUM 100 MG/1
200 CAPSULE, LIQUID FILLED ORAL 2 TIMES DAILY PRN
Status: DISCONTINUED | OUTPATIENT
Start: 2020-10-30 | End: 2020-10-31 | Stop reason: HOSPADM

## 2020-10-29 RX ORDER — IBUPROFEN 600 MG/1
600 TABLET ORAL EVERY 6 HOURS
Status: DISCONTINUED | OUTPATIENT
Start: 2020-10-30 | End: 2020-10-31 | Stop reason: HOSPADM

## 2020-10-29 RX ORDER — DIPHENHYDRAMINE HYDROCHLORIDE 50 MG/ML
25 INJECTION INTRAMUSCULAR; INTRAVENOUS EVERY 4 HOURS PRN
Status: DISCONTINUED | OUTPATIENT
Start: 2020-10-30 | End: 2020-10-31 | Stop reason: HOSPADM

## 2020-10-29 RX ORDER — HYDROCORTISONE 25 MG/G
CREAM TOPICAL 3 TIMES DAILY PRN
Status: DISCONTINUED | OUTPATIENT
Start: 2020-10-30 | End: 2020-10-31 | Stop reason: HOSPADM

## 2020-10-29 RX ORDER — ACETAMINOPHEN 325 MG/1
650 TABLET ORAL EVERY 6 HOURS PRN
Status: DISCONTINUED | OUTPATIENT
Start: 2020-10-30 | End: 2020-10-31 | Stop reason: HOSPADM

## 2020-10-29 RX ORDER — OXYTOCIN/RINGER'S LACTATE 30/500 ML
95 PLASTIC BAG, INJECTION (ML) INTRAVENOUS ONCE
Status: COMPLETED | OUTPATIENT
Start: 2020-10-30 | End: 2020-10-30

## 2020-10-29 RX ORDER — LIDOCAINE HYDROCHLORIDE AND EPINEPHRINE 15; 5 MG/ML; UG/ML
INJECTION, SOLUTION EPIDURAL
Status: DISCONTINUED | OUTPATIENT
Start: 2020-10-29 | End: 2020-10-29

## 2020-10-29 RX ORDER — FENTANYL CITRATE 50 UG/ML
INJECTION, SOLUTION INTRAMUSCULAR; INTRAVENOUS
Status: COMPLETED
Start: 2020-10-29 | End: 2020-10-29

## 2020-10-29 RX ORDER — OXYCODONE AND ACETAMINOPHEN 5; 325 MG/1; MG/1
1 TABLET ORAL EVERY 4 HOURS PRN
Status: DISCONTINUED | OUTPATIENT
Start: 2020-10-30 | End: 2020-10-31 | Stop reason: HOSPADM

## 2020-10-29 RX ORDER — SIMETHICONE 80 MG
1 TABLET,CHEWABLE ORAL EVERY 6 HOURS PRN
Status: DISCONTINUED | OUTPATIENT
Start: 2020-10-30 | End: 2020-10-31 | Stop reason: HOSPADM

## 2020-10-29 RX ORDER — FENTANYL CITRATE 50 UG/ML
INJECTION, SOLUTION INTRAMUSCULAR; INTRAVENOUS
Status: DISCONTINUED | OUTPATIENT
Start: 2020-10-29 | End: 2020-10-29

## 2020-10-29 RX ORDER — OXYTOCIN/RINGER'S LACTATE 30/500 ML
334 PLASTIC BAG, INJECTION (ML) INTRAVENOUS ONCE
Status: DISCONTINUED | OUTPATIENT
Start: 2020-10-29 | End: 2020-10-29

## 2020-10-29 RX ORDER — PRENATAL WITH FERROUS FUM AND FOLIC ACID 3080; 920; 120; 400; 22; 1.84; 3; 20; 10; 1; 12; 200; 27; 25; 2 [IU]/1; [IU]/1; MG/1; [IU]/1; MG/1; MG/1; MG/1; MG/1; MG/1; MG/1; UG/1; MG/1; MG/1; MG/1; MG/1
1 TABLET ORAL DAILY
Status: DISCONTINUED | OUTPATIENT
Start: 2020-10-30 | End: 2020-10-31 | Stop reason: HOSPADM

## 2020-10-29 RX ORDER — ONDANSETRON 8 MG/1
8 TABLET, ORALLY DISINTEGRATING ORAL EVERY 8 HOURS PRN
Status: DISCONTINUED | OUTPATIENT
Start: 2020-10-30 | End: 2020-10-31 | Stop reason: HOSPADM

## 2020-10-29 RX ORDER — SIMETHICONE 80 MG
1 TABLET,CHEWABLE ORAL 4 TIMES DAILY PRN
Status: DISCONTINUED | OUTPATIENT
Start: 2020-10-29 | End: 2020-10-29

## 2020-10-29 RX ORDER — SODIUM CHLORIDE, SODIUM LACTATE, POTASSIUM CHLORIDE, CALCIUM CHLORIDE 600; 310; 30; 20 MG/100ML; MG/100ML; MG/100ML; MG/100ML
INJECTION, SOLUTION INTRAVENOUS CONTINUOUS
Status: DISCONTINUED | OUTPATIENT
Start: 2020-10-29 | End: 2020-10-29

## 2020-10-29 RX ORDER — ONDANSETRON 8 MG/1
8 TABLET, ORALLY DISINTEGRATING ORAL EVERY 8 HOURS PRN
Status: DISCONTINUED | OUTPATIENT
Start: 2020-10-29 | End: 2020-10-29

## 2020-10-29 RX ORDER — FAMOTIDINE 10 MG/ML
20 INJECTION INTRAVENOUS ONCE
Status: DISCONTINUED | OUTPATIENT
Start: 2020-10-29 | End: 2020-10-29

## 2020-10-29 RX ORDER — OXYCODONE AND ACETAMINOPHEN 10; 325 MG/1; MG/1
1 TABLET ORAL EVERY 4 HOURS PRN
Status: DISCONTINUED | OUTPATIENT
Start: 2020-10-30 | End: 2020-10-31 | Stop reason: HOSPADM

## 2020-10-29 RX ORDER — CEFAZOLIN SODIUM 2 G/50ML
2 SOLUTION INTRAVENOUS ONCE AS NEEDED
Status: DISCONTINUED | OUTPATIENT
Start: 2020-10-29 | End: 2020-10-29

## 2020-10-29 RX ORDER — DIPHENHYDRAMINE HCL 25 MG
25 CAPSULE ORAL EVERY 4 HOURS PRN
Status: DISCONTINUED | OUTPATIENT
Start: 2020-10-30 | End: 2020-10-31 | Stop reason: HOSPADM

## 2020-10-29 RX ORDER — FENTANYL/BUPIVACAINE/NS/PF 2MCG/ML-.1
PLASTIC BAG, INJECTION (ML) INJECTION
Status: DISPENSED
Start: 2020-10-29 | End: 2020-10-29

## 2020-10-29 RX ORDER — CARBOPROST TROMETHAMINE 250 UG/ML
INJECTION, SOLUTION INTRAMUSCULAR
Status: DISCONTINUED
Start: 2020-10-29 | End: 2020-10-30 | Stop reason: WASHOUT

## 2020-10-29 RX ORDER — OXYTOCIN/RINGER'S LACTATE 30/500 ML
2 PLASTIC BAG, INJECTION (ML) INTRAVENOUS CONTINUOUS
Status: DISCONTINUED | OUTPATIENT
Start: 2020-10-29 | End: 2020-10-29

## 2020-10-29 RX ORDER — BUPIVACAINE HYDROCHLORIDE 2.5 MG/ML
INJECTION, SOLUTION EPIDURAL; INFILTRATION; INTRACAUDAL
Status: DISPENSED
Start: 2020-10-29 | End: 2020-10-29

## 2020-10-29 RX ORDER — OXYTOCIN/RINGER'S LACTATE 30/500 ML
95 PLASTIC BAG, INJECTION (ML) INTRAVENOUS ONCE
Status: DISCONTINUED | OUTPATIENT
Start: 2020-10-29 | End: 2020-10-29

## 2020-10-29 RX ORDER — MISOPROSTOL 200 UG/1
TABLET ORAL
Status: COMPLETED
Start: 2020-10-29 | End: 2020-10-29

## 2020-10-29 RX ORDER — TRANEXAMIC ACID 100 MG/ML
INJECTION, SOLUTION INTRAVENOUS
Status: DISCONTINUED
Start: 2020-10-29 | End: 2020-10-30 | Stop reason: WASHOUT

## 2020-10-29 RX ORDER — METHYLERGONOVINE MALEATE 0.2 MG/ML
INJECTION INTRAVENOUS
Status: DISCONTINUED
Start: 2020-10-29 | End: 2020-10-30 | Stop reason: WASHOUT

## 2020-10-29 RX ORDER — SODIUM CITRATE AND CITRIC ACID MONOHYDRATE 334; 500 MG/5ML; MG/5ML
30 SOLUTION ORAL ONCE
Status: DISCONTINUED | OUTPATIENT
Start: 2020-10-29 | End: 2020-10-29

## 2020-10-29 RX ORDER — FENTANYL/BUPIVACAINE/NS/PF 2MCG/ML-.1
PLASTIC BAG, INJECTION (ML) INJECTION CONTINUOUS
Status: DISCONTINUED | OUTPATIENT
Start: 2020-10-29 | End: 2020-10-29

## 2020-10-29 RX ADMIN — SODIUM CHLORIDE, SODIUM LACTATE, POTASSIUM CHLORIDE, AND CALCIUM CHLORIDE 1000 ML: .6; .31; .03; .02 INJECTION, SOLUTION INTRAVENOUS at 11:10

## 2020-10-29 RX ADMIN — Medication 5 ML: at 11:10

## 2020-10-29 RX ADMIN — SODIUM CHLORIDE, SODIUM LACTATE, POTASSIUM CHLORIDE, AND CALCIUM CHLORIDE: .6; .31; .03; .02 INJECTION, SOLUTION INTRAVENOUS at 08:10

## 2020-10-29 RX ADMIN — Medication 2 MILLI-UNITS/MIN: at 09:10

## 2020-10-29 RX ADMIN — SODIUM CHLORIDE, SODIUM LACTATE, POTASSIUM CHLORIDE, AND CALCIUM CHLORIDE: .6; .31; .03; .02 INJECTION, SOLUTION INTRAVENOUS at 06:10

## 2020-10-29 RX ADMIN — SODIUM CHLORIDE, SODIUM LACTATE, POTASSIUM CHLORIDE, AND CALCIUM CHLORIDE: .6; .31; .03; .02 INJECTION, SOLUTION INTRAVENOUS at 01:10

## 2020-10-29 RX ADMIN — FENTANYL CITRATE 100 MCG: 50 INJECTION, SOLUTION INTRAMUSCULAR; INTRAVENOUS at 11:10

## 2020-10-29 RX ADMIN — MISOPROSTOL 800 MCG: 200 TABLET ORAL at 11:10

## 2020-10-29 RX ADMIN — Medication 10 ML: at 11:10

## 2020-10-29 RX ADMIN — LIDOCAINE HYDROCHLORIDE,EPINEPHRINE BITARTRATE 3 ML: 15; .005 INJECTION, SOLUTION EPIDURAL; INFILTRATION; INTRACAUDAL; PERINEURAL at 11:10

## 2020-10-29 NOTE — PROGRESS NOTES
"LABOR NOTE    S:  Complaints: No.  Epidural working:  yes  MD to bedside for routine cervical exam.    O: /61   Pulse 85   Temp 98.7 °F (37.1 °C) (Oral)   Resp 16   Ht 5' 3" (1.6 m)   Wt 65.5 kg (144 lb 8 oz)   LMP 02/09/2020   SpO2 100%   Breastfeeding No   BMI 25.60 kg/m²     FHT: 125 bpm, moderate variability, +accels, -decels Cat 1 (reassuring)  CTX: q 2 minutes, pit @ 14  SVE: 4/60/-2    TIMELINE:  0900: 1/60/-3, balloon placed  1115: 2/60/-3, pit @8  1200: SROM clear  1415: 4/60/-2, pit @ 12, balloon out  1630: 4/60/-2, pit @ 14      PLAN:    Continue Close Maternal/Fetal Monitoring  Pitocin Augmentation per protocol  Recheck 2 hours or PRN    Pretty Oates MD/MPH  OB/GYN PGY1      "

## 2020-10-29 NOTE — PLAN OF CARE
10/29/20 1443   OB SCREEN   Source of Information health record   Any indications/suspicions for None   Is this a teen pregnancy No   Indication for adoption/Safe Haven No   Indication for DME/post-acute needs No   HIV (+) No   Any congenital  disorders No   Fetal demise/ death No       This patient has been screened for Social Work discharge planning needs. Based on  documentation in medical record , no discharge planning needs are anticipated at this time. Should any discharge planning needs arise, please consult . For urgent needs/consults, contact the  listed below at the number provided.    Karen Clemens LCSW-[facility]  NICU   Ext. [phone]-phone  [email or web address]

## 2020-10-29 NOTE — ANESTHESIA PROCEDURE NOTES
Epidural    Patient location during procedure: OB   Reason for block: primary anesthetic   Diagnosis: iup   Start time: 10/29/2020 11:40 AM  Timeout: 10/29/2020 11:40 AM  End time: 10/29/2020 11:50 AM    Staffing  Performing Provider: Randell Loya MD  Authorizing Provider: Leonarda Maldonado MD        Preanesthetic Checklist  Completed: patient identified, site marked, surgical consent, pre-op evaluation, timeout performed, IV checked, risks and benefits discussed, monitors and equipment checked, anesthesia consent given, hand hygiene performed and patient being monitored  Preparation  Patient position: sitting  Prep: ChloraPrep  Patient monitoring: Pulse Ox, Blood Pressure and ECG  Epidural  Skin Anesthetic: lidocaine 1%  Administration type: continuous  Approach: midline  Interspace: L4-5    Injection technique: LINDA air  Needle and Epidural Catheter  Needle type: Tuohy   Needle gauge: 17  Needle length: 3.5 inches  Needle insertion depth: 5.5 cm  Catheter type: multi-orifice and springwound  Catheter size: 19 G  Catheter at skin depth: 10 cm  Test dose: 3 mL of lidocaine 1.5% with Epi 1-to-200,000  Additional Documentation: incremental injection, negative aspiration for heme and CSF, no signs/symptoms of IV or SA injection, no paresthesia on injection, no significant pain on injection and no significant complaints from patient  Needle localization: anatomical landmarksNo inadvertent dural puncture with Tuohy.  Dural puncture performed with spinal needle.

## 2020-10-29 NOTE — H&P
HISTORY AND PHYSICAL                                                OBSTETRICS          Subjective:       Cass Shipley is a 35 y.o.  female with IUP at 37w0d weeks gestation who presents with IOL 2/2 FGR(3%).    Patient denies contractions, denies vaginal bleeding, denies LOF.   Fetal Movement: normal.    This IUP is complicated by FGR (3%), Rh- AMA.    Review of Systems   Constitutional: Negative for chills and fever.   Respiratory: Negative for cough and shortness of breath.    Cardiovascular: Negative for chest pain and palpitations.   Gastrointestinal: Negative for abdominal pain, constipation, diarrhea, nausea and vomiting.   Genitourinary: Negative for dysuria, urgency, vaginal bleeding, vaginal discharge and vaginal pain.   Musculoskeletal: Negative for arthralgias.   Integumentary:  Negative for rash.   Neurological: Negative for syncope and headaches.       PMHx:   Past Medical History:   Diagnosis Date    Abnormal Pap smear     Allergy     Anxiety     GERD (gastroesophageal reflux disease)     Hemorrhoids without complication     Supraventricular tachycardia        PSHx:   Past Surgical History:   Procedure Laterality Date    CERVICAL BIOPSY  W/ LOOP ELECTRODE EXCISION      VAGINAL DELIVERY         All:   Review of patient's allergies indicates:   Allergen Reactions    Vicodin [hydrocodone-acetaminophen]      ITCHING       Meds:   Medications Prior to Admission   Medication Sig Dispense Refill Last Dose    docusate sodium (COLACE) 50 MG capsule Take by mouth 2 (two) times daily.   10/28/2020 at Unknown time    prenatal vit-iron fum-folic ac (PRENATAL TABLET) 28 mg iron- 800 mcg Tab Take 1 tablet by mouth once daily. 30 tablet 12 10/29/2020 at Unknown time    ferrous sulfate (FEOSOL) 325 mg (65 mg iron) Tab tablet Take 325 mg by mouth daily with breakfast.   Unknown at Unknown time    hydrocortisone-pramoxine (ANALPRAM-HC) 2.5-1 % Crea Place rectally 3 (three) times daily.  (Patient not taking: Reported on 10/15/2020) 1 Tube 1        SH:   Social History     Socioeconomic History    Marital status:      Spouse name: Not on file    Number of children: Not on file    Years of education: Not on file    Highest education level: Not on file   Occupational History     Employer: [employer]   Social Needs    Financial resource strain: Not on file    Food insecurity     Worry: Not on file     Inability: Not on file    Transportation needs     Medical: Not on file     Non-medical: Not on file   Tobacco Use    Smoking status: Current Some Day Smoker     Packs/day: 0.50     Years: 6.00     Pack years: 3.00    Smokeless tobacco: Never Used   Substance and Sexual Activity    Alcohol use: Yes     Alcohol/week: 2.0 standard drinks     Types: 2 Glasses of wine per week    Drug use: No    Sexual activity: Yes     Partners: Male     Birth control/protection: None   Lifestyle    Physical activity     Days per week: Not on file     Minutes per session: Not on file    Stress: Not on file   Relationships    Social connections     Talks on phone: Not on file     Gets together: Not on file     Attends Mormonism service: Not on file     Active member of club or organization: Not on file     Attends meetings of clubs or organizations: Not on file     Relationship status: Not on file   Other Topics Concern    Not on file   Social History Narrative    Not on file       FH:   Family History   Problem Relation Age of Onset    Arrhythmia Mother     Breast cancer Neg Hx     Cancer Neg Hx     Colon cancer Neg Hx     Diabetes Neg Hx     Eclampsia Neg Hx     Hypertension Neg Hx     Miscarriages / Stillbirths Neg Hx     Ovarian cancer Neg Hx      labor Neg Hx     Stroke Neg Hx        OBHx:   OB History    Para Term  AB Living   3 1 1 0 1 1   SAB TAB Ectopic Multiple Live Births   1 0 0 0 1      # Outcome Date GA Lbr Timothy/2nd Weight Sex Delivery Anes PTL  Lv   3 Current            2 SAB 01/01/20        FD   1 Term 07/28/08    M Vag-Spont   QUE      Name: Gautam       Objective:       /74 (BP Location: Left arm, Patient Position: Sitting)   Pulse (!) 112   Temp 98.2 °F (36.8 °C) (Temporal)   Resp 18   LMP 02/09/2020   SpO2 100%     Vitals:    10/29/20 0812   BP: 106/74   BP Location: Left arm   Patient Position: Sitting   Pulse: (!) 112   Resp: 18   Temp: 98.2 °F (36.8 °C)   TempSrc: Temporal   SpO2: 100%       General: NAD, alert, oriented, cooperative  HEENT: NCAT, EOM grossly intact  Lungs: Normal WOB  Heart: regular rate  Abdomen: gravid, soft, nondistended, nontender, no rebound or guarding  Extremities: no edema    FHT: 120 bpm, moderate BTBV, +accels, -decels; Cat 1 (reassuring)  [address]: irregular  Presentation: cephalic by ultrasound    Cervix: 1/60/-3    EFW by Leopold's: 5#    Lab Review  Blood Type A NEG  GBBS: negative  Rubella: Immune  RPR: NR  HIV: negative  HepB: negative       Assessment:       37w0d weeks gestation with IOL 2/2 FGR (3%).    Active Hospital Problems    Diagnosis  POA    Encounter for induction of labor [Z34.90]  Not Applicable      Resolved Hospital Problems   No resolved problems to display.          Plan:     1. IOL   Risks, benefits, alternatives and possible complications have been discussed in detail with the patient.   - Consents signed and to chart  - Admit to Labor and Delivery unit  - Epidural per Anesthesia  - Draw CBC, T&S  - Notify Staff  - Recheck in 2 hrs or PRN  - Avalos balloon in place  - Start pitocin augmentation per protocol  - Post-Partum Hemorrhage risk - low    2. FGR (3%)  - BPP 8/8 on 10/28  - AC 1%  - high-normal SD ratio on UA dopplers  - MVP wnl    3. Rh-  - Will require postpartum Rhogam workup      Pretty Oates MD/MPH  OB/GYN PGY1

## 2020-10-29 NOTE — PROGRESS NOTES
"LABOR NOTE    S:  Complaints: No.  Epidural working:  yes  MD to bedside for routine cervical exam.    O: BP (!) 100/59   Pulse 77   Temp 98.3 °F (36.8 °C) (Oral)   Resp 16   Ht 5' 3" (1.6 m)   Wt 65.5 kg (144 lb 8 oz)   LMP 02/09/2020   SpO2 100%   Breastfeeding No   BMI 25.60 kg/m²     FHT: 130 bpm, moderate variability, +accels, -decels Cat 1 (reassuring)  CTX: q 2 minutes, pit @ 12  SVE: 4/60/-2    TIMELINE:  0900: 1/60/-3, balloon placed  1115: 2/60/-3, pit @8  1200: SROM clear  1415: 4/60/-2, pit @ 12, balloon out      PLAN:    Continue Close Maternal/Fetal Monitoring  Pitocin Augmentation per protocol  Recheck 2 hours or PRN    Pretty Oates MD/MPH  OB/GYN PGY1      "

## 2020-10-29 NOTE — PROGRESS NOTES
"LABOR NOTE    S:  Complaints: No.  Epidural working:  yes  MD to bedside for routine cervical exam.    O: BP (!) 100/59   Pulse 77   Temp 98.3 °F (36.8 °C) (Oral)   Resp 16   Ht 5' 3" (1.6 m)   Wt 65.5 kg (144 lb 8 oz)   LMP 02/09/2020   SpO2 100%   Breastfeeding No   BMI 25.60 kg/m²     FHT: 135 bpm, moderate variability, +accels, -decels Cat 1 (reassuring)  CTX: q 3 minutes, pit @ 8  SVE: 2/60/-3    TIMELINE:  0900: 1/60/-3, balloon placed  1115: 2/60/-3, pit @8        PLAN:    Continue Close Maternal/Fetal Monitoring  Pitocin Augmentation per protocol  Recheck 2 hours or PRN    Pretty Oates MD/MPH  OB/GYN PGY1      "

## 2020-10-29 NOTE — ANESTHESIA PREPROCEDURE EVALUATION
[facility]  Anesthesia Pre-Operative Evaluation         Patient Name: Cass Shipley  YOB: 1985  MRN: [id number]    10/29/2020      Cass Shipley is a 35 y.o. female  @ 37w0d who presents for induction due to severe IGUR. She states that other than the IGUR and hyperemesis in the first trimester her pregnancy has been uncomplicated. She also has a history of SVT s/p ablation x3 with most recent ablation 11 years ago. Her previous pregnancy was uncomplicated and culminated in a . She denies HTN, DM, asthma, spinal pathology.     OB History    Para Term  AB Living   3 1 1   1 1   SAB TAB Ectopic Multiple Live Births   1       1      # Outcome Date GA Lbr Timothy/2nd Weight Sex Delivery Anes PTL Lv   3 Current            2 SAB 20        FD   1 Term 08    M Vag-Spont   QUE       Review of patient's allergies indicates:   Allergen Reactions    Vicodin [hydrocodone-acetaminophen]      ITCHING       Wt Readings from Last 1 Encounters:   10/15/20 1138 65.4 kg (144 lb 2.9 oz)       BP Readings from Last 3 Encounters:   10/29/20 106/74   10/15/20 (!) 102/58   10/09/20 102/64       Patient Active Problem List   Diagnosis    Syncope    Palpitations    SVT (supraventricular tachycardia)    Smoker unmotivated to quit    Vaginitis    Sinusitis    Exposure to STD    Pre-ulcerative corn or callous    Vaginosis    Breast mass in female    Rh negative state in antepartum period    Hemorrhoids during pregnancy in third trimester    Multigravida of advanced maternal age in third trimester    Intrauterine growth restriction (IUGR) affecting care of mother, third trimester, single gestation    Encounter for induction of labor       Past Surgical History:   Procedure Laterality Date    CERVICAL BIOPSY  W/ LOOP ELECTRODE EXCISION      VAGINAL DELIVERY         Social History     Socioeconomic History    Marital status:      Spouse name: Not  on file    Number of children: Not on file    Years of education: Not on file    Highest education level: Not on file   Occupational History     Employer: [employer]   Social Needs    Financial resource strain: Not on file    Food insecurity     Worry: Not on file     Inability: Not on file    Transportation needs     Medical: Not on file     Non-medical: Not on file   Tobacco Use    Smoking status: Current Some Day Smoker     Packs/day: 0.50     Years: 6.00     Pack years: 3.00    Smokeless tobacco: Never Used   Substance and Sexual Activity    Alcohol use: Yes     Alcohol/week: 2.0 standard drinks     Types: 2 Glasses of wine per week    Drug use: No    Sexual activity: Yes     Partners: Male     Birth control/protection: None   Lifestyle    Physical activity     Days per week: Not on file     Minutes per session: Not on file    Stress: Not on file   Relationships    Social connections     Talks on phone: Not on file     Gets together: Not on file     Attends Sabianism service: Not on file     Active member of club or organization: Not on file     Attends meetings of clubs or organizations: Not on file     Relationship status: Not on file   Other Topics Concern    Not on file   Social History Narrative    Not on file         Chemistry        Component Value Date/Time     10/09/2020 1000    K 3.8 10/09/2020 1000     10/09/2020 1000    CO2 17 (L) 10/09/2020 1000    BUN 7 10/09/2020 1000    CREATININE 0.5 10/09/2020 1000    GLU 81 10/09/2020 1000        Component Value Date/Time    CALCIUM 8.9 10/09/2020 1000    ALKPHOS 164 (H) 10/09/2020 1000    AST 11 10/09/2020 1000    ALT 7 (L) 10/09/2020 1000    BILITOT 0.2 10/09/2020 1000    ESTGFRAFRICA >60 10/09/2020 1000    EGFRNONAA >60 10/09/2020 1000            Lab Results   Component Value Date    WBC 13.70 (H) 10/15/2020    HGB 11.1 (L) 10/15/2020    HCT 34.3 (L) 10/15/2020    MCV 97 10/15/2020     (H) 10/15/2020       No  results for input(s): PT, INR, PROTIME, APTT in the last 72 hours.          Anesthesia Evaluation          Review of Systems  Anesthesia Hx:  Denies Family Hx of Anesthesia complications.  Denies Personal Hx of Anesthesia complications.   Social:  Non-Smoker, No Alcohol Use    Cardiovascular:   Dysrhythmias SVT s/p ablation x3 with most recent 11 years ago   Pulmonary:  Pulmonary Normal    Endocrine:  Endocrine Normal        Physical Exam  General:  Well nourished    Airway/Jaw/Neck:  Airway Findings: Mouth Opening: Normal Mallampati: I  TM Distance: Normal, at least 6 cm            Mental Status:  Mental Status Findings:  Cooperative, Alert and Oriented         Anesthesia Plan  Type of Anesthesia, risks & benefits discussed:  Anesthesia Type:  CSE, epidural, general, spinal  Patient's Preference:   Intra-op Monitoring Plan: standard ASA monitors  Intra-op Monitoring Plan Comments:   Post Op Pain Control Plan: multimodal analgesia  Post Op Pain Control Plan Comments:   Induction:   IV  Beta Blocker:  Patient is not currently on a Beta-Blocker (No further documentation required).       Informed Consent: Patient understands risks and agrees with Anesthesia plan.  Questions answered. Anesthesia consent signed with patient.  ASA Score: 2     Day of Surgery Review of History & Physical:    H&P update referred to the surgeon.         Ready For Surgery From Anesthesia Perspective.

## 2020-10-30 ENCOUNTER — PATIENT MESSAGE (OUTPATIENT)
Dept: OBSTETRICS AND GYNECOLOGY | Facility: CLINIC | Age: 35
End: 2020-10-30

## 2020-10-30 LAB
ABO + RH BLD: NORMAL
ALLENS TEST: ABNORMAL
ALLENS TEST: ABNORMAL
BASOPHILS # BLD AUTO: 0.08 K/UL (ref 0–0.2)
BASOPHILS NFR BLD: 0.3 % (ref 0–1.9)
BLD GP AB SCN CELLS X3 SERPL QL: NORMAL
DELSYS: ABNORMAL
DELSYS: ABNORMAL
DIFFERENTIAL METHOD: ABNORMAL
EOSINOPHIL # BLD AUTO: 0.1 K/UL (ref 0–0.5)
EOSINOPHIL NFR BLD: 0.5 % (ref 0–8)
ERYTHROCYTE [DISTWIDTH] IN BLOOD BY AUTOMATED COUNT: 12.4 % (ref 11.5–14.5)
FETAL CELL SCN BLD QL ROSETTE: NORMAL
HCO3 UR-SCNC: 19.9 MMOL/L (ref 24–28)
HCO3 UR-SCNC: 21.6 MMOL/L (ref 24–28)
HCT VFR BLD AUTO: 32.4 % (ref 37–48.5)
HGB BLD-MCNC: 10.8 G/DL (ref 12–16)
IMM GRANULOCYTES # BLD AUTO: 0.1 K/UL (ref 0–0.04)
IMM GRANULOCYTES NFR BLD AUTO: 0.4 % (ref 0–0.5)
INJECT RH IG VOL PATIENT: NORMAL ML
LYMPHOCYTES # BLD AUTO: 2.5 K/UL (ref 1–4.8)
LYMPHOCYTES NFR BLD: 11 % (ref 18–48)
MCH RBC QN AUTO: 31.2 PG (ref 27–31)
MCHC RBC AUTO-ENTMCNC: 33.3 G/DL (ref 32–36)
MCV RBC AUTO: 94 FL (ref 82–98)
MODE: ABNORMAL
MODE: ABNORMAL
MONOCYTES # BLD AUTO: 1.8 K/UL (ref 0.3–1)
MONOCYTES NFR BLD: 7.7 % (ref 4–15)
NEUTROPHILS # BLD AUTO: 18.4 K/UL (ref 1.8–7.7)
NEUTROPHILS NFR BLD: 80.1 % (ref 38–73)
NRBC BLD-RTO: 0 /100 WBC
PCO2 BLDA: 41.9 MMHG (ref 35–45)
PCO2 BLDA: 48.9 MMHG (ref 35–45)
PH SMN: 7.25 [PH] (ref 7.35–7.45)
PH SMN: 7.28 [PH] (ref 7.35–7.45)
PLATELET # BLD AUTO: 383 K/UL (ref 150–350)
PMV BLD AUTO: 9.8 FL (ref 9.2–12.9)
PO2 BLDA: 18 MMHG (ref 80–100)
PO2 BLDA: 25 MMHG (ref 80–100)
POC BE: -6 MMOL/L
POC BE: -7 MMOL/L
POC SATURATED O2: 21 % (ref 95–100)
POC SATURATED O2: 39 % (ref 95–100)
RBC # BLD AUTO: 3.46 M/UL (ref 4–5.4)
SAMPLE: ABNORMAL
SAMPLE: ABNORMAL
SITE: ABNORMAL
SITE: ABNORMAL
WBC # BLD AUTO: 22.98 K/UL (ref 3.9–12.7)

## 2020-10-30 PROCEDURE — 82803 BLOOD GASES ANY COMBINATION: CPT

## 2020-10-30 PROCEDURE — 25000003 PHARM REV CODE 250: Performed by: STUDENT IN AN ORGANIZED HEALTH CARE EDUCATION/TRAINING PROGRAM

## 2020-10-30 PROCEDURE — 72200005 HC VAGINAL DELIVERY LEVEL II

## 2020-10-30 PROCEDURE — 11000001 HC ACUTE MED/SURG PRIVATE ROOM

## 2020-10-30 PROCEDURE — 63600175 PHARM REV CODE 636 W HCPCS: Performed by: STUDENT IN AN ORGANIZED HEALTH CARE EDUCATION/TRAINING PROGRAM

## 2020-10-30 PROCEDURE — 99900035 HC TECH TIME PER 15 MIN (STAT)

## 2020-10-30 PROCEDURE — 86850 RBC ANTIBODY SCREEN: CPT

## 2020-10-30 PROCEDURE — 85461 HEMOGLOBIN FETAL: CPT

## 2020-10-30 PROCEDURE — 36415 COLL VENOUS BLD VENIPUNCTURE: CPT

## 2020-10-30 PROCEDURE — 63600519 RHOGAM PHARM REV CODE 636 ALT 250 W HCPCS: Performed by: STUDENT IN AN ORGANIZED HEALTH CARE EDUCATION/TRAINING PROGRAM

## 2020-10-30 PROCEDURE — 85025 COMPLETE CBC W/AUTO DIFF WBC: CPT

## 2020-10-30 RX ORDER — HYDROCODONE BITARTRATE AND ACETAMINOPHEN 5; 325 MG/1; MG/1
1 TABLET ORAL EVERY 6 HOURS PRN
Qty: 10 TABLET | Refills: 0 | OUTPATIENT
Start: 2020-10-30 | End: 2020-12-17

## 2020-10-30 RX ORDER — IBUPROFEN 600 MG/1
600 TABLET ORAL EVERY 6 HOURS PRN
Qty: 30 TABLET | Refills: 2 | Status: SHIPPED | OUTPATIENT
Start: 2020-10-30 | End: 2020-12-17

## 2020-10-30 RX ADMIN — Medication 95 MILLI-UNITS/MIN: at 12:10

## 2020-10-30 RX ADMIN — IBUPROFEN 600 MG: 600 TABLET, FILM COATED ORAL at 06:10

## 2020-10-30 RX ADMIN — ACETAMINOPHEN 650 MG: 325 TABLET, FILM COATED ORAL at 12:10

## 2020-10-30 RX ADMIN — IBUPROFEN 600 MG: 600 TABLET, FILM COATED ORAL at 12:10

## 2020-10-30 RX ADMIN — IBUPROFEN 600 MG: 600 TABLET, FILM COATED ORAL at 07:10

## 2020-10-30 RX ADMIN — HUMAN RHO(D) IMMUNE GLOBULIN 300 MCG: 300 INJECTION, SOLUTION INTRAMUSCULAR at 01:10

## 2020-10-30 RX ADMIN — OXYCODONE HYDROCHLORIDE AND ACETAMINOPHEN 1 TABLET: 10; 325 TABLET ORAL at 08:10

## 2020-10-30 RX ADMIN — PRENATAL VIT W/ FE FUMARATE-FA TAB 27-0.8 MG 1 TABLET: 27-0.8 TAB at 08:10

## 2020-10-30 RX ADMIN — OXYCODONE HYDROCHLORIDE AND ACETAMINOPHEN 1 TABLET: 5; 325 TABLET ORAL at 03:10

## 2020-10-30 RX ADMIN — OXYCODONE HYDROCHLORIDE AND ACETAMINOPHEN 1 TABLET: 10; 325 TABLET ORAL at 12:10

## 2020-10-30 NOTE — PROGRESS NOTES
"POSTPARTUM PROGRESS NOTE     Cass Shipley is a 35 y.o. female PPD #1 status post Spontaneous vaginal delivery at 37w0d in a pregnancy complicated by FGR (3%), Rh- AMA.    Patient is doing well this morning. She denies nausea, vomiting, fever or chills. Patient reports mild abdominal pain that is adequately relieved by oral pain medications. Lochia is moderate and stable. Patient complains of having "gush" of vaginal bleeding upon standing. Denies soaking through more than one pad per hour for 2 consecutive hours. Denies feeling dizzy or lightheaded. Patient is voiding without difficulty and ambulating with no difficulty. She has passed flatus.    Patient does plan to breast feed. Per primary OB for contraception.     Objective:       Temp:  [97.2 °F (36.2 °C)-99.1 °F (37.3 °C)] 98.9 °F (37.2 °C)  Pulse:  [] 89  Resp:  [16-18] 18  SpO2:  [97 %-100 %] 97 %  BP: ()/(50-82) 111/66    General:   alert, appears stated age and cooperative   Lungs:   Non-labored respirations    Heart:   regular rate and rhythm   Abdomen:  Soft, nondistended    Uterus:  firm located at the umbilicus.    Extremities: no pedal edema noted     Lab Review  Recent Results (from the past 4 hour(s))   Post Partum Type and Screen    Collection Time: 10/30/20  4:11 AM   Result Value Ref Range    Group & Rh A NEG     Indirect Therese POS    Lety - [facility] (Fetal Bleed Screen)    Collection Time: 10/30/20  4:11 AM   Result Value Ref Range    Lety - [facility] (Fetal Bleed Screen) NEG    CBC auto differential    Collection Time: 10/30/20  4:11 AM   Result Value Ref Range    WBC 22.98 (H) 3.90 - 12.70 K/uL    RBC 3.46 (L) 4.00 - 5.40 M/uL    Hemoglobin 10.8 (L) 12.0 - 16.0 g/dL    Hematocrit 32.4 (L) 37.0 - 48.5 %    MCV 94 82 - 98 fL    MCH 31.2 (H) 27.0 - 31.0 pg    MCHC 33.3 32.0 - 36.0 g/dL    RDW 12.4 11.5 - 14.5 %    Platelets 383 (H) 150 - 350 K/uL    MPV 9.8 9.2 - 12.9 fL    Immature Granulocytes 0.4 0.0 - 0.5 %    Gran # (ANC) 18.4 " (H) 1.8 - 7.7 K/uL    Immature Grans (Abs) 0.10 (H) 0.00 - 0.04 K/uL    Lymph # 2.5 1.0 - 4.8 K/uL    Mono # 1.8 (H) 0.3 - 1.0 K/uL    Eos # 0.1 0.0 - 0.5 K/uL    Baso # 0.08 0.00 - 0.20 K/uL    nRBC 0 0 /100 WBC    Gran % 80.1 (H) 38.0 - 73.0 %    Lymph % 11.0 (L) 18.0 - 48.0 %    Mono % 7.7 4.0 - 15.0 %    Eosinophil % 0.5 0.0 - 8.0 %    Basophil % 0.3 0.0 - 1.9 %    Differential Method Automated        I/O    Intake/Output Summary (Last 24 hours) at 10/30/2020 0701  Last data filed at 10/30/2020 0122  Gross per 24 hour   Intake --   Output 2200 ml   Net -2200 ml        Assessment:     Patient Active Problem List   Diagnosis    Syncope    Palpitations    SVT (supraventricular tachycardia)    Smoker unmotivated to quit    Vaginitis    Sinusitis    Exposure to STD    Pre-ulcerative corn or callous    Vaginosis    Breast mass in female    Rh negative state in antepartum period    Hemorrhoids during pregnancy in third trimester    Multigravida of advanced maternal age in third trimester    Intrauterine growth restriction (IUGR) affecting care of mother, third trimester, single gestation    Encounter for induction of labor     (spontaneous vaginal delivery)        Plan:   1. Postpartum care:  - Patient doing well. Continue routine management and advances.  - Continue PO pain meds. Pain well controlled.  - Heme: H/h 12/37 >>> 10.8/32.4  - Patient with complaints of moderate lochia, worse upon standing, H/H stable, no symptoms of anemia, will continue to closely monitor  - Encourage ambulation  - Contraception to be discussed at 6 week pp visit  - Lactation consult PRN    2. Rh -   - Patient will need Rho alejandro during post partum period      Dispo: As patient meets milestones, will plan to discharge PPD#2.      Domingo Gaytan M.D.  OB/GYN PGY-1

## 2020-10-30 NOTE — LACTATION NOTE
10/30/20 1400   Breasts WDL   Breast WDL WDL   Breast Pumping   Breast Pumping double electric breast pump utilized   Breast Pumping Interventions frequent pumping encouraged   Maternal Feeding Assessment   Maternal Emotional State assist needed   Reproductive Interventions   Breast Care: Breastfeeding open to air   Breastfeeding Assistance feeding cue recognition promoted;feeding on demand promoted;manual breast pump utilized   Breastfeeding Support diary/feeding log utilized;encouragement provided;infant-mother separation minimized;lactation counseling provided;maternal hydration promoted;maternal nutrition promoted;maternal rest encouraged   lactation rounds. Pt called LC for assistance with using breast pump. Pt has Medela pump in style pump in box at bedside. LC explained that we suggest using hospital equipment while here and the hospital will supply a Symphony breast pump to use at the bedside. Education on use and care of symphony breast pump reviewed. Pump initiated. LC reviewed pump in style use for home use. Equipment left in packaging at this time. NICU booklet given.

## 2020-10-30 NOTE — L&D DELIVERY NOTE
[facility]  Vaginal Delivery   Operative Note    SUMMARY     Normal spontaneous vaginal delivery of live infant after pushing for approximately 3 minutes. skin to skin was unable to be performed due to fetal growth restriction. Infant delivered position OA over intact perineum. Nuchal cord: No.    Spontaneous delivery of placenta and IV pitocin given noting uterine atony with bleeding; cytotec DC given w/ improvement of tone and bleeding. No lacerations noted. Patient tolerated delivery well. Sponge needle and lap counted correctly x2.    Indications:  (spontaneous vaginal delivery)  Pregnancy complicated by:   Patient Active Problem List   Diagnosis    Syncope    Palpitations    SVT (supraventricular tachycardia)    Smoker unmotivated to quit    Vaginitis    Sinusitis    Exposure to STD    Pre-ulcerative corn or callous    Vaginosis    Breast mass in female    Rh negative state in antepartum period    Hemorrhoids during pregnancy in third trimester    Multigravida of advanced maternal age in third trimester    Intrauterine growth restriction (IUGR) affecting care of mother, third trimester, single gestation    Encounter for induction of labor     (spontaneous vaginal delivery)     Admitting GA: 37w0d    Delivery Information for Goran Shipley    Birth information:  YOB: 2021   Time of birth: 11:31 PM   Sex: female   Head Delivery Date/Time: 10/29/2020 11:31 PM   Delivery type: Vaginal, Spontaneous   Gestational Age: 37w0d    Delivery Providers    Delivering clinician: Shanika Armenta MD   Provider Role    Rama Stafford MD Resident    Frank Villalba MD Resident    Mary Whitman RN Registered Nurse    Ahmet Mohan RN Registered Nurse    Harish Frank RN Registered Nurse    Lisa Murphy, [facility]            Measurements    Weight:   Length:          Apgars    Living status: Living  Apgars:  1 min.:  5 min.:  10 min.:  15  min.:  20 min.:    Skin color:         Heart rate:         Reflex irritability:         Muscle tone:         Respiratory effort:         Total:         Apgars assigned by: NICU         Operative Delivery    Forceps attempted?: No  Vacuum extractor attempted?: No         Shoulder Dystocia    Shoulder dystocia present?: No           Presentation    Presentation: Vertex           Interventions/Resuscitation    Method: Bulb Suctioning, Tactile Stimulation       Cord    Vessels: 3 vessels  Complications: None  Delayed Cord Clamping?: Yes  Cord Clamped Date/Time: 10/29/2020 11:33 PM  Cord Blood Disposition: Sent with Baby  Gases Sent?: Yes  Stem Cell Collection (by MD): No       Placenta    Placenta delivery date/time: 10/29/2020 2336  Placenta removal: Spontaneous  Placenta appearance: Intact  Placenta disposition: pathology           Labor Events:       labor: No     Labor Onset Date/Time:         Dilation Complete Date/Time:         Start Pushing Date/Time:       Rupture Date/Time: 10/29/20  1210         Rupture type: SRM (Spontaneous Rupture)         Fluid Amount:       Fluid Color: Clear       steroids: None     Antibiotics given for GBS: No     Induction: oxytocin     Indications for induction:        Augmentation:       Indications for augmentation:       Labor complications: None     Additional complications:          Cervical ripening:                     Delivery:      Episiotomy: None     Indication for Episiotomy:       Perineal Lacerations: None Repaired:      Periurethral Laceration:   Repaired:     Labial Laceration:   Repaired:     Sulcus Laceration:   Repaired:     Vaginal Laceration:   Repaired:     Cervical Laceration:   Repaired:     Repair suture: None     Repair # of packets:       Last Value - EBL - Nursing (mL):       Sum - EBL - Nursing (mL): 0     Last Value - EBL - Anesthesia (mL):      Calculated QBL (mL): 300      Vaginal Sweep Performed: Yes     Surgicount Correct: Yes        Other providers:       Anesthesia    Method: Epidural          Details (if applicable):  Trial of Labor      Categorization:      Priority:     Indications for :     Incision Type:       Additional  information:  Forceps:    Vacuum:    Breech:    Observed anomalies    Other (Comments):          CAITLYN Villalba MD  OBGYN PGY2

## 2020-10-30 NOTE — PROGRESS NOTES
LABOR PROGRESS NOTE    S:  MD to bedside for cervical check. Complaints: None.      O: Temp:  [98.1 °F (36.7 °C)-98.7 °F (37.1 °C)] 98.1 °F (36.7 °C)  Pulse:  [] 82  Resp:  [16-18] 16  SpO2:  [98 %-100 %] 99 %  BP: ()/(50-74) 125/61    FHT: 135 BPM/moderate beat to beat variability/+accels/-decels   CTX: q 1-4 minutes,     Dilation (cm): 4  Effacement (%): 60  Station: -2  Dose(units) Oxytocin: *20 aliza-units/min     ASSESSMENT:   35 y.o.  at 37w0d, latent  FHT reassuring  Cat 1      TIMELINE  0900: 1/60/-3, balloon placed  1115: 2/60/-3, pit @8  1200: SROM clear  1415: 4/60/-2, pit @ 12, balloon out  1630: 4/60/-2, pit @ 14  2100: 4/70/-2; IUPC placed      PLAN:  1. Labor management  -Continue Close Maternal/Fetal Monitoring.   -Pitocin augmentation per protocol,   -Recheck 2-4 hours or PRN    CAITLYN Villalba MD  OBGYN PGY2

## 2020-10-30 NOTE — PROGRESS NOTES
Pt anxious to get to NICU to see baby. Pt assisted to BR and then placed in wheelchair. Discussed with pt importance of early breast pumping. Pt states has own breast pump and will start to pump as soon as she gets back to room. Pt and  left room to go to NICU  0330- call received from NICU. Pt became lightheaded and nauseated while speaking with NNP. After arriving to NICU, PT informed RN she was feeling better. Pt taken back to room, vitals obtained and pt taken to BR. Pt given medication. Discussed with pt need to rest and try to sleep. Will return to PT in one hour to help with pumping

## 2020-10-30 NOTE — PROGRESS NOTES
LABOR PROGRESS NOTE    S:  MD to bedside for cervical check. Complaints: None.    O: Temp:  [98.1 °F (36.7 °C)-99.1 °F (37.3 °C)] 99.1 °F (37.3 °C)  Pulse:  [] 90  Resp:  [16-18] 16  SpO2:  [97 %-100 %] 97 %  BP: ()/(50-74) 110/61    FHT: 120 BPM moderate beat to beat variability/+accels/+ late decels   CTX: q 1-3 minutes,     Dilation (cm): 10  Effacement (%): 100  Station: +2  Dose(units) Oxytocin: *20 aliza-units/min     ASSESSMENT:   35 y.o.  at 37w0d, complete  FHT reassuring  Cat 2      TIMELINE  0900: 1/60/-3, balloon placed  1115: 2/60/-3, pit @8  1200: SROM clear  1415: 4/60/-2, pit @ 12, balloon out  1630: 4/60/-2, pit @ 14  2100: 4/70/-2; IUPC placed  1115: 10100/+2      PLAN:  1. Labor management  -Set up to push  -Staff called  -Continue Close Maternal/Fetal Monitoring.   -Pause pit until pushing    CAITLYN Villalba MD  OBGYN PGY2

## 2020-10-30 NOTE — PLAN OF CARE
Copied from baby's NICU chart:     SOCIAL WORK DISCHARGE PLANNING ASSESSMENT    Sw completed discharge planning assessment with pt's parents at pt's bedside.  Pt's parents were slow to warm-up. Education on the role of  was provided. Emotional support provided throughout assessment.      Legal Name: Alexandria Mcdonald :  10/29/2020    Patient Active Problem List   Diagnosis    SGA (small for gestational age)     thrombocytopenia    Hyperbilirubinemia,          [facility]irth [facility]   AMY: 2021    Birth Weight: 1.931 kg (4 lb 4.1 oz)  Birth Length: 46 cm  Gestational Age: 37w0d          Apgars    Living status: Living  Apgars:  1 min.:  5 min.:  10 min.:  15 min.:  20 min.:    Skin color:  1  1       Heart rate:  2  2       Reflex irritability:  2  2       Muscle tone:  2  2       Respiratory effort:  2  2       Total:  9  9       Apgars assigned by: [facility]         Address: [address] [place] [address]  Phone: [phone]  Email Address: [email or web address]     Support person(s): Anna Bullock ([facility]) [phone]    Commercial Insurance Coverage: Yes  Mother's BC/BS of [facility] (formerly [place] Medicaid): Primary: No Secondary: No      Pediatrician: Dr. Lyly Oates at [facility]      Nutrition: Formula    Breast Pump:   Yes    Has already obtained from [employer] insurance company    WIC:   N/A     Essential Items: (includes car seat, crib/bassinet/pack-n-play, clothing, bottles, diapers, etc.)  Acquired     Transportation: Personal vehicle and Family/friends     Education: Information given on NICU Education Classes; Physician/NNP daily rounds; and Postpartum Depression signs.     Resources Given:  [facility] [employer], Preparing for Your Baby's Discharge Home, [facility] List, Medicaid Transportation, Support Resources for NICU Families, Postpartum Depression, My Preemie Jarret My NICU Baby Jarret, and WIC.       Potential  Eligibility for SSI Benefits: No    Potential Discharge Needs:  None     Denice Meraz LCSW    [facility] [place]  [email or web address]    (phone) [phone] or  Ahf. [phone]  (fax) [phone]

## 2020-10-30 NOTE — PROGRESS NOTES
10/29/20 9627   TeleStork Martín Note - Strip   Strip Reviewed by Martín Nurse? Yes   TeleStork Martín Note - Communication   [place] Nurse Communicated with Bedside Nurse Regarding: Fetal Status   TeleStork Martín Note - Notification   Nurse Notified? Yes   Name of Nurse JARRED Leo   Variables with occ late decels, RN bedside

## 2020-10-30 NOTE — ANESTHESIA POSTPROCEDURE EVALUATION
Anesthesia Post Evaluation    Patient: Cass Shipley    Procedure(s) Performed: * No procedures listed *    Final Anesthesia Type: epidural    Patient location during evaluation: labor & delivery  Patient participation: Yes- Able to Participate  Level of consciousness: oriented and awake and alert  Post-procedure vital signs: reviewed and stable  Pain management: adequate  Airway patency: patent    PONV status at discharge: No PONV  Anesthetic complications: no      Cardiovascular status: blood pressure returned to baseline and hemodynamically stable  Respiratory status: unassisted  Hydration status: euvolemic  Follow-up not needed.          Vitals Value Taken Time   /74 10/30/20 0759   Temp 36.6 °C (97.8 °F) 10/30/20 0759   Pulse 102 10/30/20 0759   Resp 18 10/30/20 0802   SpO2 99 % 10/30/20 0759         No case tracking events are documented in the log.      Pain/Mason Score: Pain Rating Prior to Med Admin: 7 (10/30/2020  8:02 AM)  Pain Rating Post Med Admin: 2 (10/30/2020  9:00 AM)

## 2020-10-31 VITALS
HEIGHT: 63 IN | OXYGEN SATURATION: 98 % | WEIGHT: 144.5 LBS | TEMPERATURE: 99 F | SYSTOLIC BLOOD PRESSURE: 111 MMHG | HEART RATE: 80 BPM | RESPIRATION RATE: 18 BRPM | DIASTOLIC BLOOD PRESSURE: 66 MMHG | BODY MASS INDEX: 25.6 KG/M2

## 2020-10-31 PROBLEM — Z34.90 ENCOUNTER FOR INDUCTION OF LABOR: Status: RESOLVED | Noted: 2020-10-29 | Resolved: 2020-10-31

## 2020-10-31 PROCEDURE — 99024 PR POST-OP FOLLOW-UP VISIT: ICD-10-PCS | Mod: ,,, | Performed by: OBSTETRICS & GYNECOLOGY

## 2020-10-31 PROCEDURE — 25000003 PHARM REV CODE 250: Performed by: STUDENT IN AN ORGANIZED HEALTH CARE EDUCATION/TRAINING PROGRAM

## 2020-10-31 PROCEDURE — 99024 POSTOP FOLLOW-UP VISIT: CPT | Mod: ,,, | Performed by: OBSTETRICS & GYNECOLOGY

## 2020-10-31 RX ORDER — OXYCODONE AND ACETAMINOPHEN 5; 325 MG/1; MG/1
1 TABLET ORAL EVERY 4 HOURS PRN
Qty: 10 TABLET | Refills: 0 | Status: SHIPPED | OUTPATIENT
Start: 2020-10-31 | End: 2020-12-17

## 2020-10-31 RX ADMIN — OXYCODONE HYDROCHLORIDE AND ACETAMINOPHEN 1 TABLET: 10; 325 TABLET ORAL at 09:10

## 2020-10-31 RX ADMIN — DOCUSATE SODIUM 200 MG: 100 CAPSULE, LIQUID FILLED ORAL at 09:10

## 2020-10-31 RX ADMIN — OXYCODONE HYDROCHLORIDE AND ACETAMINOPHEN 1 TABLET: 10; 325 TABLET ORAL at 04:10

## 2020-10-31 RX ADMIN — OXYCODONE HYDROCHLORIDE AND ACETAMINOPHEN 1 TABLET: 10; 325 TABLET ORAL at 05:10

## 2020-10-31 RX ADMIN — IBUPROFEN 600 MG: 600 TABLET, FILM COATED ORAL at 12:10

## 2020-10-31 RX ADMIN — IBUPROFEN 600 MG: 600 TABLET, FILM COATED ORAL at 05:10

## 2020-10-31 RX ADMIN — PRENATAL VIT W/ FE FUMARATE-FA TAB 27-0.8 MG 1 TABLET: 27-0.8 TAB at 09:10

## 2020-10-31 NOTE — PROGRESS NOTES
POSTPARTUM PROGRESS NOTE     Cass Shipley is a 35 y.o. female PPD #2 status post Spontaneous vaginal delivery at 37w0d in a pregnancy complicated by FGR (3%), Rh- AMA.    Patient is doing well this morning. She denies nausea, vomiting, fever or chills. Patient reports mild abdominal pain that is adequately relieved by oral pain medications. Lochia is moderate and stable. Denies feeling dizzy or lightheaded. Patient is voiding without difficulty and ambulating with no difficulty. She has passed flatus.    Patient does plan to breast feed. Per primary OB for contraception.     Objective:       Temp:  [97.8 °F (36.6 °C)] 97.8 °F (36.6 °C)  Pulse:  [] 67  Resp:  [16-18] 18  SpO2:  [99 %] 99 %  BP: (102-116)/(56-74) 102/56    General:   alert, appears stated age and cooperative   Lungs:   Non-labored respirations    Heart:   regular rate and rhythm   Abdomen:  Soft, nondistended    Uterus:  firm located at the umbilicus.    Extremities: no pedal edema noted     Lab Review  No results found for this or any previous visit (from the past 4 hour(s)).    I/O    Intake/Output Summary (Last 24 hours) at 10/31/2020 0513  Last data filed at 10/30/2020 1130  Gross per 24 hour   Intake --   Output 250 ml   Net -250 ml        Assessment:     Patient Active Problem List   Diagnosis    Syncope    Palpitations    SVT (supraventricular tachycardia)    Smoker unmotivated to quit    Vaginitis    Sinusitis    Exposure to STD    Pre-ulcerative corn or callous    Vaginosis    Breast mass in female    Rh negative state in antepartum period    Hemorrhoids during pregnancy in third trimester    Multigravida of advanced maternal age in third trimester    Intrauterine growth restriction (IUGR) affecting care of mother, third trimester, single gestation    Encounter for induction of labor     (spontaneous vaginal delivery)        Plan:   1. Postpartum care:  - Patient doing well. Continue routine management and  advances.  - Continue PO pain meds. Pain well controlled.  - Heme: H/h 12/37 >>> 10.8/32.4  - Patient with complaints of moderate lochia, worse upon standing, H/H stable, no symptoms of anemia, will continue to closely monitor  - Encourage ambulation  - Contraception to be discussed at 6 week pp visit  - Lactation consult PRN    2. Rh -   - Patient will need Rho alejandro during post partum period      Dispo: As patient meets milestones, will plan to discharge PPD#2.      Pretty Oates MD/MPH  OB/GYN PGY1

## 2020-10-31 NOTE — LACTATION NOTE
Lactation rounds: Patient states she does not want to talk about pumping or discharge instructions right now. States she will call LC when she is ready. LC number written on board.

## 2020-10-31 NOTE — LACTATION NOTE
10/31/20 1315   Maternal Assessment   Breast Density Bilateral:;soft   Areola dense;elastic   Nipples Bilateral:;everted   Equipment Type   Breast Pump Type double electric, hospital grade   Breast Pump Flange Type hard   Breast Pump Flange Size 27 mm   Breast Pumping   Breast Pumping Interventions frequent pumping encouraged   Breast Pumping double electric breast pump utilized   Patient presently pumping with 24 mm flanges. Tight fit noted. 27 mm flanges swapped. Pumping at highest suction level most comfortable. Mother stated concern that she is not pumping larger amounts. Discussed dailt target amounts and that pumping drops is what is expected for day 2. Discharge instructions given. Mother has Medela Pump In Style at home. Transport bag given.

## 2020-10-31 NOTE — PLAN OF CARE
Patient in no apparent distress. VSS. Ambulating without difficulty. No needs at this time. Discharge papers signed. Mother Baby care guide reviewed. All questions answered.

## 2020-10-31 NOTE — DISCHARGE SUMMARY
Delivery Discharge Summary  Obstetrics      Primary OB Clinician: Brandyn Roche IV, MD      Admission date: 10/29/2020  Discharge date: 10/31/2020    Disposition: To home, self care    Discharge Diagnosis List:      Patient Active Problem List   Diagnosis    Syncope    Palpitations    SVT (supraventricular tachycardia)    Smoker unmotivated to quit    Vaginitis    Sinusitis    Exposure to STD    Pre-ulcerative corn or callous    Vaginosis    Breast mass in female    Rh negative state in antepartum period    Hemorrhoids during pregnancy in third trimester    Multigravida of advanced maternal age in third trimester    Intrauterine growth restriction (IUGR) affecting care of mother, third trimester, single gestation     (spontaneous vaginal delivery)       Procedure:     Hospital Course:  Cass Shipley is a 35 y.o. now , PPD #2 who was admitted on 10/29/2020 at 37w0d for IOL secondary to fetal growth restriction at the 3rd perecentile. Patient was subsequently admitted to labor and delivery unit with signed consents.     Labor course was uncomplicated and resulted in  without complications.     Please see delivery note for further details. Her postpartum course was uncomplicated. On discharge day, patient's pain is controlled with oral pain medications. Pt is tolerating ambulation without SOB or CP, and regular diet without N/V. Reports lochia is mild. Denies any HA, vision changes, F/C, LE swelling. Denies any breast pain/soreness.    Pt in stable condition and ready for discharge. She has been instructed to start and/or continue medications and follow up with her obstetrics provider as listed below.    Pertinent studies:  CBC  Recent Labs   Lab 10/29/20  0841 10/30/20  0411   WBC 15.10* 22.98*   HGB 11.9* 10.8*   HCT 36.6* 32.4*   MCV 96 94   * 383*          Immunization History   Administered Date(s) Administered    Influenza - Quadrivalent - PF *Preferred* (6 months  and older) 10/15/2020    Rho (D) Immune Globulin 01/08/2020, 09/01/2020    Rho (D) Immune Globulin - IM 10/30/2020    Tdap 09/01/2020        Delivery:    Episiotomy: None   Lacerations: None   Repair suture: None   Repair # of packets:     Blood loss (ml):       Birth information:  YOB: 2021   Time of birth: 11:31 PM   Sex: female   Delivery type: Vaginal, Spontaneous   Gestational Age: 37w0d    Delivery Clinician:      Other providers:       Additional  information:  Forceps:    Vacuum:    Breech:    Observed anomalies      Living?:           APGARS  One minute Five minutes Ten minutes   Skin color:         Heart rate:         Grimace:         Muscle tone:         Breathing:         Totals: 9  9        Placenta: Delivered:       appearance      Patient Instructions:   Current Discharge Medication List      START taking these medications    Details   ibuprofen (ADVIL,MOTRIN) 600 MG tablet Take 1 tablet (600 mg total) by mouth every 6 (six) hours as needed for Pain.  Qty: 30 tablet, Refills: 2         CONTINUE these medications which have NOT CHANGED    Details   docusate sodium (COLACE) 50 MG capsule Take by mouth 2 (two) times daily.      prenatal vit-iron fum-folic ac (PRENATAL TABLET) 28 mg iron- 800 mcg Tab Take 1 tablet by mouth once daily.  Qty: 30 tablet, Refills: 12    Associated Diagnoses: Pregnancy, unspecified gestational age      ferrous sulfate (FEOSOL) 325 mg (65 mg iron) Tab tablet Take 325 mg by mouth daily with breakfast.      hydrocortisone-pramoxine (ANALPRAM-HC) 2.5-1 % Crea Place rectally 3 (three) times daily.  Qty: 1 Tube, Refills: 1    Associated Diagnoses: Hemorrhoids during pregnancy in second trimester             Discharge Procedure Orders   Pelvic Rest     Notify your health care provider if you experience any of the following:  temperature >100.4     Notify your health care provider if you experience any of the following:  persistent nausea and vomiting or diarrhea      Notify your health care provider if you experience any of the following:  severe uncontrolled pain     Notify your health care provider if you experience any of the following:  difficulty breathing or increased cough     Notify your health care provider if you experience any of the following:  severe persistent headache     Notify your health care provider if you experience any of the following:  persistent dizziness, light-headedness, or visual disturbances     Notify your health care provider if you experience any of the following:  increased confusion or weakness     Notify your health care provider if you experience any of the following:   Order Comments: Heavy vaginal bleeding >1 pad/hour for greater than 2 hours     Activity as tolerated       Follow-up Information     Brandyn Roche Iv, MD In 6 weeks.    Specialties: Obstetrics, Obstetrics and Gynecology  Why: post partum visit  Contact information:  [address] [id number] [phone]                    Pretty Oates MD/MPH  OB/GYN PGY1

## 2020-11-02 LAB
FINAL PATHOLOGIC DIAGNOSIS: NORMAL
GROSS: NORMAL
Lab: NORMAL

## 2020-11-04 ENCOUNTER — HOSPITAL ENCOUNTER (OUTPATIENT)
Facility: OTHER | Age: 35
Discharge: HOME | DRG: 776 | End: 2020-11-06
Attending: EMERGENCY MEDICINE | Admitting: OBSTETRICS & GYNECOLOGY
Payer: COMMERCIAL

## 2020-11-04 DIAGNOSIS — R10.2 PELVIC PAIN: ICD-10-CM

## 2020-11-04 DIAGNOSIS — R10.30 LOWER ABDOMINAL PAIN: ICD-10-CM

## 2020-11-04 LAB
ABO + RH BLD: NORMAL
ALBUMIN SERPL BCP-MCNC: 3 G/DL (ref 3.5–5.2)
ALP SERPL-CCNC: 159 U/L (ref 55–135)
ALT SERPL W/O P-5'-P-CCNC: 28 U/L (ref 10–44)
ANION GAP SERPL CALC-SCNC: 11 MMOL/L (ref 8–16)
AST SERPL-CCNC: 23 U/L (ref 10–40)
BASOPHILS # BLD AUTO: 0.08 K/UL (ref 0–0.2)
BASOPHILS NFR BLD: 0.5 % (ref 0–1.9)
BILIRUB SERPL-MCNC: 0.2 MG/DL (ref 0.1–1)
BILIRUB UR QL STRIP: NEGATIVE
BLD GP AB SCN CELLS X3 SERPL QL: NORMAL
BLOOD GROUP ANTIBODIES SERPL: NORMAL
BUN SERPL-MCNC: 13 MG/DL (ref 6–20)
CALCIUM SERPL-MCNC: 10.1 MG/DL (ref 8.7–10.5)
CHLORIDE SERPL-SCNC: 113 MMOL/L (ref 95–110)
CLARITY UR: CLEAR
CO2 SERPL-SCNC: 20 MMOL/L (ref 23–29)
COLOR UR: YELLOW
CREAT SERPL-MCNC: 0.6 MG/DL (ref 0.5–1.4)
DIFFERENTIAL METHOD: ABNORMAL
EOSINOPHIL # BLD AUTO: 0.3 K/UL (ref 0–0.5)
EOSINOPHIL NFR BLD: 1.8 % (ref 0–8)
ERYTHROCYTE [DISTWIDTH] IN BLOOD BY AUTOMATED COUNT: 12.8 % (ref 11.5–14.5)
EST. GFR  (AFRICAN AMERICAN): >60 ML/MIN/1.73 M^2
EST. GFR  (NON AFRICAN AMERICAN): >60 ML/MIN/1.73 M^2
GLUCOSE SERPL-MCNC: 95 MG/DL (ref 70–110)
GLUCOSE UR QL STRIP: NEGATIVE
HCT VFR BLD AUTO: 39.5 % (ref 37–48.5)
HGB BLD-MCNC: 12.5 G/DL (ref 12–16)
HGB UR QL STRIP: ABNORMAL
IMM GRANULOCYTES # BLD AUTO: 0.1 K/UL (ref 0–0.04)
IMM GRANULOCYTES NFR BLD AUTO: 0.6 % (ref 0–0.5)
INR PPP: 0.9 (ref 0.8–1.2)
KETONES UR QL STRIP: NEGATIVE
LEUKOCYTE ESTERASE UR QL STRIP: NEGATIVE
LYMPHOCYTES # BLD AUTO: 3.8 K/UL (ref 1–4.8)
LYMPHOCYTES NFR BLD: 23.2 % (ref 18–48)
MCH RBC QN AUTO: 31.1 PG (ref 27–31)
MCHC RBC AUTO-ENTMCNC: 31.6 G/DL (ref 32–36)
MCV RBC AUTO: 98 FL (ref 82–98)
MONOCYTES # BLD AUTO: 0.9 K/UL (ref 0.3–1)
MONOCYTES NFR BLD: 5.3 % (ref 4–15)
NEUTROPHILS # BLD AUTO: 11.3 K/UL (ref 1.8–7.7)
NEUTROPHILS NFR BLD: 68.6 % (ref 38–73)
NITRITE UR QL STRIP: NEGATIVE
NRBC BLD-RTO: 0 /100 WBC
PH UR STRIP: 6 [PH] (ref 5–8)
PLATELET # BLD AUTO: 592 K/UL (ref 150–350)
PMV BLD AUTO: 9.1 FL (ref 9.2–12.9)
POTASSIUM SERPL-SCNC: 3.8 MMOL/L (ref 3.5–5.1)
PROT SERPL-MCNC: 6.9 G/DL (ref 6–8.4)
PROT UR QL STRIP: NEGATIVE
PROTHROMBIN TIME: 9.6 SEC (ref 9–12.5)
RBC # BLD AUTO: 4.02 M/UL (ref 4–5.4)
SARS-COV-2 RDRP RESP QL NAA+PROBE: NEGATIVE
SODIUM SERPL-SCNC: 144 MMOL/L (ref 136–145)
SP GR UR STRIP: 1.02 (ref 1–1.03)
URN SPEC COLLECT METH UR: ABNORMAL
UROBILINOGEN UR STRIP-ACNC: NEGATIVE EU/DL
WBC # BLD AUTO: 16.5 K/UL (ref 3.9–12.7)

## 2020-11-04 PROCEDURE — 99285 EMERGENCY DEPT VISIT HI MDM: CPT | Mod: 24,,, | Performed by: OBSTETRICS & GYNECOLOGY

## 2020-11-04 PROCEDURE — 86900 BLOOD TYPING SEROLOGIC ABO: CPT

## 2020-11-04 PROCEDURE — 96376 TX/PRO/DX INJ SAME DRUG ADON: CPT

## 2020-11-04 PROCEDURE — 63600175 PHARM REV CODE 636 W HCPCS: Performed by: STUDENT IN AN ORGANIZED HEALTH CARE EDUCATION/TRAINING PROGRAM

## 2020-11-04 PROCEDURE — 81003 URINALYSIS AUTO W/O SCOPE: CPT

## 2020-11-04 PROCEDURE — 85025 COMPLETE CBC W/AUTO DIFF WBC: CPT

## 2020-11-04 PROCEDURE — 85610 PROTHROMBIN TIME: CPT

## 2020-11-04 PROCEDURE — G0378 HOSPITAL OBSERVATION PER HR: HCPCS

## 2020-11-04 PROCEDURE — 11000001 HC ACUTE MED/SURG PRIVATE ROOM

## 2020-11-04 PROCEDURE — 99285 EMERGENCY DEPT VISIT HI MDM: CPT | Mod: 25

## 2020-11-04 PROCEDURE — 96375 TX/PRO/DX INJ NEW DRUG ADDON: CPT

## 2020-11-04 PROCEDURE — U0002 COVID-19 LAB TEST NON-CDC: HCPCS

## 2020-11-04 PROCEDURE — 96365 THER/PROPH/DIAG IV INF INIT: CPT

## 2020-11-04 PROCEDURE — 86850 RBC ANTIBODY SCREEN: CPT

## 2020-11-04 PROCEDURE — 80053 COMPREHEN METABOLIC PANEL: CPT

## 2020-11-04 PROCEDURE — 25000003 PHARM REV CODE 250: Performed by: EMERGENCY MEDICINE

## 2020-11-04 PROCEDURE — P9612 CATHETERIZE FOR URINE SPEC: HCPCS

## 2020-11-04 PROCEDURE — 63600175 PHARM REV CODE 636 W HCPCS: Performed by: EMERGENCY MEDICINE

## 2020-11-04 PROCEDURE — 25000003 PHARM REV CODE 250: Performed by: OBSTETRICS & GYNECOLOGY

## 2020-11-04 PROCEDURE — 99285 PR EMERGENCY DEPT VISIT,LEVEL V: ICD-10-PCS | Mod: 24,,, | Performed by: OBSTETRICS & GYNECOLOGY

## 2020-11-04 PROCEDURE — 63600175 PHARM REV CODE 636 W HCPCS: Performed by: OBSTETRICS & GYNECOLOGY

## 2020-11-04 PROCEDURE — 25000003 PHARM REV CODE 250: Performed by: STUDENT IN AN ORGANIZED HEALTH CARE EDUCATION/TRAINING PROGRAM

## 2020-11-04 PROCEDURE — 86870 RBC ANTIBODY IDENTIFICATION: CPT

## 2020-11-04 PROCEDURE — 25500020 PHARM REV CODE 255: Performed by: OBSTETRICS & GYNECOLOGY

## 2020-11-04 RX ORDER — HYDROMORPHONE HYDROCHLORIDE 2 MG/ML
1 INJECTION, SOLUTION INTRAMUSCULAR; INTRAVENOUS; SUBCUTANEOUS ONCE
Status: DISCONTINUED | OUTPATIENT
Start: 2020-11-04 | End: 2020-11-04

## 2020-11-04 RX ORDER — OXYCODONE AND ACETAMINOPHEN 5; 325 MG/1; MG/1
1 TABLET ORAL EVERY 4 HOURS PRN
Status: DISCONTINUED | OUTPATIENT
Start: 2020-11-04 | End: 2020-11-06 | Stop reason: HOSPADM

## 2020-11-04 RX ORDER — ACETAMINOPHEN 325 MG/1
650 TABLET ORAL EVERY 6 HOURS PRN
Status: DISCONTINUED | OUTPATIENT
Start: 2020-11-04 | End: 2020-11-06 | Stop reason: HOSPADM

## 2020-11-04 RX ORDER — PRENATAL WITH FERROUS FUM AND FOLIC ACID 3080; 920; 120; 400; 22; 1.84; 3; 20; 10; 1; 12; 200; 27; 25; 2 [IU]/1; [IU]/1; MG/1; [IU]/1; MG/1; MG/1; MG/1; MG/1; MG/1; MG/1; UG/1; MG/1; MG/1; MG/1; MG/1
1 TABLET ORAL DAILY
Status: DISCONTINUED | OUTPATIENT
Start: 2020-11-05 | End: 2020-11-06 | Stop reason: HOSPADM

## 2020-11-04 RX ORDER — HYDROCODONE BITARTRATE AND ACETAMINOPHEN 5; 325 MG/1; MG/1
1 TABLET ORAL EVERY 4 HOURS PRN
Status: DISCONTINUED | OUTPATIENT
Start: 2020-11-04 | End: 2020-11-04

## 2020-11-04 RX ORDER — DIPHENHYDRAMINE HYDROCHLORIDE 50 MG/ML
25 INJECTION INTRAMUSCULAR; INTRAVENOUS EVERY 4 HOURS PRN
Status: DISCONTINUED | OUTPATIENT
Start: 2020-11-04 | End: 2020-11-06 | Stop reason: HOSPADM

## 2020-11-04 RX ORDER — DOCUSATE SODIUM 100 MG/1
200 CAPSULE, LIQUID FILLED ORAL 2 TIMES DAILY PRN
Status: DISCONTINUED | OUTPATIENT
Start: 2020-11-04 | End: 2020-11-06 | Stop reason: HOSPADM

## 2020-11-04 RX ORDER — KETOROLAC TROMETHAMINE 30 MG/ML
15 INJECTION, SOLUTION INTRAMUSCULAR; INTRAVENOUS ONCE
Status: COMPLETED | OUTPATIENT
Start: 2020-11-04 | End: 2020-11-04

## 2020-11-04 RX ORDER — ONDANSETRON 8 MG/1
8 TABLET, ORALLY DISINTEGRATING ORAL EVERY 8 HOURS PRN
Status: DISCONTINUED | OUTPATIENT
Start: 2020-11-04 | End: 2020-11-06 | Stop reason: HOSPADM

## 2020-11-04 RX ORDER — HYDROMORPHONE HYDROCHLORIDE 2 MG/ML
1 INJECTION, SOLUTION INTRAMUSCULAR; INTRAVENOUS; SUBCUTANEOUS ONCE
Status: COMPLETED | OUTPATIENT
Start: 2020-11-04 | End: 2020-11-04

## 2020-11-04 RX ORDER — HYDROCORTISONE 25 MG/G
CREAM TOPICAL 3 TIMES DAILY PRN
Status: DISCONTINUED | OUTPATIENT
Start: 2020-11-04 | End: 2020-11-06 | Stop reason: HOSPADM

## 2020-11-04 RX ORDER — HYDROMORPHONE HYDROCHLORIDE 1 MG/ML
0.5 INJECTION, SOLUTION INTRAMUSCULAR; INTRAVENOUS; SUBCUTANEOUS
Status: COMPLETED | OUTPATIENT
Start: 2020-11-04 | End: 2020-11-04

## 2020-11-04 RX ORDER — CLINDAMYCIN PHOSPHATE 900 MG/50ML
900 INJECTION, SOLUTION INTRAVENOUS
Status: DISCONTINUED | OUTPATIENT
Start: 2020-11-04 | End: 2020-11-06 | Stop reason: HOSPADM

## 2020-11-04 RX ORDER — IBUPROFEN 600 MG/1
600 TABLET ORAL EVERY 6 HOURS
Status: DISCONTINUED | OUTPATIENT
Start: 2020-11-04 | End: 2020-11-06 | Stop reason: HOSPADM

## 2020-11-04 RX ORDER — SIMETHICONE 80 MG
1 TABLET,CHEWABLE ORAL EVERY 6 HOURS PRN
Status: DISCONTINUED | OUTPATIENT
Start: 2020-11-04 | End: 2020-11-06 | Stop reason: HOSPADM

## 2020-11-04 RX ORDER — DIPHENHYDRAMINE HCL 25 MG
25 CAPSULE ORAL EVERY 4 HOURS PRN
Status: DISCONTINUED | OUTPATIENT
Start: 2020-11-04 | End: 2020-11-06 | Stop reason: HOSPADM

## 2020-11-04 RX ORDER — HYDROCODONE BITARTRATE AND ACETAMINOPHEN 10; 325 MG/1; MG/1
1 TABLET ORAL EVERY 4 HOURS PRN
Status: DISCONTINUED | OUTPATIENT
Start: 2020-11-04 | End: 2020-11-04

## 2020-11-04 RX ORDER — OXYCODONE AND ACETAMINOPHEN 10; 325 MG/1; MG/1
1 TABLET ORAL EVERY 4 HOURS PRN
Status: DISCONTINUED | OUTPATIENT
Start: 2020-11-04 | End: 2020-11-06 | Stop reason: HOSPADM

## 2020-11-04 RX ORDER — OXYTOCIN/RINGER'S LACTATE 30/500 ML
95 PLASTIC BAG, INJECTION (ML) INTRAVENOUS ONCE
Status: DISCONTINUED | OUTPATIENT
Start: 2020-11-04 | End: 2020-11-04

## 2020-11-04 RX ADMIN — HYDROMORPHONE HYDROCHLORIDE 1 MG: 2 INJECTION INTRAMUSCULAR; INTRAVENOUS; SUBCUTANEOUS at 08:11

## 2020-11-04 RX ADMIN — OXYCODONE HYDROCHLORIDE AND ACETAMINOPHEN 1 TABLET: 10; 325 TABLET ORAL at 02:11

## 2020-11-04 RX ADMIN — IBUPROFEN 600 MG: 600 TABLET, FILM COATED ORAL at 11:11

## 2020-11-04 RX ADMIN — GENTAMICIN SULFATE 283.2 MG: 40 INJECTION, SOLUTION INTRAMUSCULAR; INTRAVENOUS at 02:11

## 2020-11-04 RX ADMIN — CLINDAMYCIN IN 5 PERCENT DEXTROSE 900 MG: 18 INJECTION, SOLUTION INTRAVENOUS at 09:11

## 2020-11-04 RX ADMIN — KETOROLAC TROMETHAMINE 15 MG: 30 INJECTION, SOLUTION INTRAMUSCULAR at 08:11

## 2020-11-04 RX ADMIN — IBUPROFEN 600 MG: 600 TABLET, FILM COATED ORAL at 05:11

## 2020-11-04 RX ADMIN — PROMETHAZINE HYDROCHLORIDE 12.5 MG: 25 INJECTION INTRAMUSCULAR; INTRAVENOUS at 08:11

## 2020-11-04 RX ADMIN — OXYCODONE HYDROCHLORIDE AND ACETAMINOPHEN 1 TABLET: 5; 325 TABLET ORAL at 07:11

## 2020-11-04 RX ADMIN — OXYCODONE HYDROCHLORIDE AND ACETAMINOPHEN 1 TABLET: 5; 325 TABLET ORAL at 11:11

## 2020-11-04 RX ADMIN — DOCUSATE SODIUM 200 MG: 100 CAPSULE, LIQUID FILLED ORAL at 07:11

## 2020-11-04 RX ADMIN — SODIUM CHLORIDE 1000 ML: 0.9 INJECTION, SOLUTION INTRAVENOUS at 07:11

## 2020-11-04 RX ADMIN — CLINDAMYCIN IN 5 PERCENT DEXTROSE 900 MG: 18 INJECTION, SOLUTION INTRAVENOUS at 03:11

## 2020-11-04 RX ADMIN — IOHEXOL 75 ML: 350 INJECTION, SOLUTION INTRAVENOUS at 10:11

## 2020-11-04 RX ADMIN — HYDROMORPHONE HYDROCHLORIDE 0.5 MG: 1 INJECTION, SOLUTION INTRAMUSCULAR; INTRAVENOUS; SUBCUTANEOUS at 07:11

## 2020-11-04 NOTE — LACTATION NOTE
"Visited mother in room, reviewed use and care of electric breastpump c the Maintain pumping pattern and the most suction that is comfortable "8 or more in 24;   how to store, label, and transport EBM; how to care for collection kit.  Mother verbalized understanding.  Mother c/o L breast not emptying well and feeling lumpy - recommended frequent pumping and to avoid a long stretch of sleep tonight and to use heat c massage prior to pumping and to use ice packs for 20min after pumping.  Mother verbalized understanding.  "

## 2020-11-04 NOTE — ED PROVIDER NOTES
Encounter Date: 2020       History     Chief Complaint   Patient presents with    Abdominal Pain     +Acute lower abd pain x1 hour PTA, with syncopal episode en route to ED.  on 10/29. Passed large clot yesterday.     This is a 34 y/o  s/p  on 10/30/2020 after IOL for FGR who presents to the [facility] with acute onset of lower abdominal pain and cramping starting this morning after pumping at about 7:15 am.  She rates it at a 10/10 with pain worse on the left side. Deliver was uncomplicated with a short 2nd stage of labor and no lacerations. She reports passing out in the car due to the pain.  She reports normal post partum bleeding, with the passage of a golf-ball sized clot 2 days ago.  She reports nausea, no vomiting.  She denies fever, chills, or urinary symptoms.  She denies back pain or any unusual activity or trauma.  She denies similar episodes in the past.  Pain is somewhat relieved with pulling her legs up.  She took percocet and motrin this morning without relief.  She has had normal daily bowel movements since delivery, no diarrhea or rectal bleeding.         Review of patient's allergies indicates:   Allergen Reactions    Vicodin [hydrocodone-acetaminophen]      ITCHING     Past Medical History:   Diagnosis Date    Abnormal Pap smear     Allergy     Anxiety     GERD (gastroesophageal reflux disease)     Hemorrhoids without complication     Supraventricular tachycardia      Past Surgical History:   Procedure Laterality Date    CERVICAL BIOPSY  W/ LOOP ELECTRODE EXCISION      VAGINAL DELIVERY       Family History   Problem Relation Age of Onset    Arrhythmia Mother     Breast cancer Neg Hx     Cancer Neg Hx     Colon cancer Neg Hx     Diabetes Neg Hx     Eclampsia Neg Hx     Hypertension Neg Hx     Miscarriages / Stillbirths Neg Hx     Ovarian cancer Neg Hx      labor Neg Hx     Stroke Neg Hx      Social History     Tobacco Use    Smoking status: Current Some Day  Smoker     Packs/day: 0.50     Years: 6.00     Pack years: 3.00    Smokeless tobacco: Never Used   Substance Use Topics    Alcohol use: Yes     Alcohol/week: 2.0 standard drinks     Types: 2 Glasses of wine per week    Drug use: No     Review of Systems   Constitutional: Negative for chills and fever.   HENT: Negative for sinus pain and sore throat.    Eyes: Negative for visual disturbance.   Respiratory: Negative for cough and shortness of breath.    Cardiovascular: Negative for chest pain and leg swelling.   Gastrointestinal: Positive for abdominal pain and nausea. Negative for constipation, diarrhea and vomiting.   Genitourinary: Positive for pelvic pain and vaginal bleeding. Negative for difficulty urinating, dysuria, flank pain and vaginal discharge.   Musculoskeletal: Negative for back pain.   Skin: Negative for rash.   Neurological: Positive for syncope. Negative for dizziness and headaches.       Physical Exam     Initial Vitals   BP Pulse Resp Temp SpO2   11/04/20 0742 11/04/20 0742 11/04/20 0744 11/04/20 0800 11/04/20 0742   (!) 119/101 100 (!) 28 98.4 °F (36.9 °C) 100 %      MAP       --              /67   Pulse 75   Temp 98.4 °F (36.9 °C) (Oral)   Resp 16   LMP 02/09/2020   SpO2 96%     Physical Exam    Vitals reviewed.  Constitutional: She appears well-developed and well-nourished. She appears distressed.   Cardiovascular: Normal rate.   Pulmonary/Chest: No respiratory distress.   Abdominal: Soft. She exhibits no distension. There is abdominal tenderness. There is no rebound and no guarding.   Moderate tenderness mid lower abdomen, L > R  No CVAT   Genitourinary:    Vagina normal.      Genitourinary Comments: Perineum intact, no lesions.  SSE with small amount of vaginal bleeding, cervix without lesions.  Bilateral adnexal tenderness and uterine fundal tenderness     Musculoskeletal: No tenderness or edema.   Neurological: She is alert and oriented to person, place, and time.   Skin: Skin  is warm and dry.   Psychiatric: She has a normal mood and affect.         ED Course   Procedures  Labs Reviewed   CBC W/ AUTO DIFFERENTIAL - Abnormal; Notable for the following components:       Result Value    WBC 16.50 (*)     MCH 31.1 (*)     MCHC 31.6 (*)     Platelets 592 (*)     MPV 9.1 (*)     Immature Granulocytes 0.6 (*)     Gran # (ANC) 11.3 (*)     Immature Grans (Abs) 0.10 (*)     All other components within normal limits   COMPREHENSIVE METABOLIC PANEL - Abnormal; Notable for the following components:    Chloride 113 (*)     CO2 20 (*)     Albumin 3.0 (*)     Alkaline Phosphatase 159 (*)     All other components within normal limits   URINALYSIS, REFLEX TO URINE CULTURE - Abnormal; Notable for the following components:    Occult Blood UA Trace (*)     All other components within normal limits    Narrative:     Specimen Source->Urine   PROTIME-INR   TYPE & SCREEN   ANTIBODY IDENTIFICATION          Imaging Results          CT Abdomen Pelvis With Contrast (Final result)  Result time 11/04/20 11:01:08    Final result by Don Lamb MD (11/04/20 11:01:08)                 Impression:      Enlarged uterus with heterogeneous enhancement consistent with patient's recent pregnancy.    Two liver masses are identified demonstrating similar enhancement patterns and likely representing benign hepatic hemangiomas.  One of these was present on a prior study dated 06/01/2004.  The other was not as well delineated on the prior examination likely due to lack of contrast on the prior study      Electronically signed by: Don Lamb MD  Date:    11/04/2020  Time:    11:01             Narrative:    EXAMINATION:  CT ABDOMEN PELVIS WITH CONTRAST    CLINICAL HISTORY:  abdominal pain post op;    TECHNIQUE:  Low dose axial images, sagittal and coronal reformations were obtained from the lung bases to the pubic symphysis following the IV administration of 75 mL of Omnipaque 350 .  Oral contrast was not  given.    COMPARISON:  CT renal stone study dated 06/01/2004.    FINDINGS:  There are mild dependent atelectatic changes within the lung bases.  Bony structures are intact without evidence for acute fracture or bone destruction.    The liver is normal in overall size.  There is a heterogeneously enhancing low-density mass within the lateral aspect of the right lobe of the liver measuring 1.3 cm in size (image 24, series 2) and this was likely present on the prior examination dated 06/01/2004.  This likely represents a benign hemangioma.  A similar appearing mass measuring 2.2 cm in size is noted within the lateral segment of the left lobe (image 27, series 2) although this mass is not confidently identified on the prior noncontrast study.  The gallbladder is present and appears unremarkable.  There is no evidence for intrahepatic or extrahepatic biliary dilatation.  The spleen, stomach, and pancreas are unremarkable.  The adrenal glands are not enlarged.  The kidneys are normal in size without evidence for abnormal renal masses or hydronephrosis.  The kidneys are noted to concentrate contrast symmetrically.  There is minimal atherosclerotic calcification within the abdominal aorta without aneurysmal dilatation.  No para-aortic lymphadenopathy is identified.  The appendix is not confidently identified.  No dilated loops of bowel are evident.  The uterus is enlarged with heterogeneous enhancement consistent with recent postpartum state.  No abnormal adnexal masses are identified.  No free fluid is identified within the pelvis.  The urinary bladder is decompressed.  There is no evidence for pelvic or inguinal lymphadenopathy.                                 Medical Decision Making:   ED Management:  VSS, no fever  Patient in acute distress on arrival, 0.5 mg IV dilaudid given in the main ED  Labs reviewed, WBC 16 (23 post delivery), CMP unremarkable, cath UA w/trace blood otherwise negative  Dilaudid 1mg, Toradol 30  mg, and phenergan 12.5 mg given IV in the main ED for acute pain necessitating intense monitoring.  Adequate relief of pain achieved.  Non-surgical abdomen, tender over fundus and bilateral adnexa, L > R.    Bedside u/s with normal appearing PP uterus, unable to assess adnexa  CT abd/pelvis obtained  Ddx includes endometritis, appendicitis, ovarian cyst/ruptured cyst, nephrolithiasis, pelvic abscess.  Ct reviewed, fundal tenderness remains significant, admit for IV antibiotics for endometritis                   ED Course as of Nov 04 1126   Wed Nov 04, 2020   1039 S/p CT, await radiologist read  Patient reports mild cramping.   Labs reviewed with patient and spouse    [AR]   1124 Discussed CT results with patient, stable liver hemangioma, o/w unremarkable. Repeat exam with significant fundal tenderness, will admit of IV antibiotics for presumed endometritis.     [AR]      ED Course User Index  [AR] Rosalie Ybarra MD            Clinical Impression:     ICD-10-CM ICD-9-CM   1. Lower abdominal pain  R10.30 789.09   2. Pelvic pain  R10.2 [id number]   3. Endometritis following delivery  O86.12 670.12                      Disposition:   Disposition: Admitted  Condition: Stable     ED Disposition Condition    Admit                             Rosalie Ybarra MD  11/04/20 1127

## 2020-11-04 NOTE — ED TRIAGE NOTES
+abdominal pain that started this morning. Pt came in guarding abdomen, crying hysterically. Pt reports that she recently delivered via vaginal birth  Thursday 10/29/2020, was d/c home Saturday. Pt reports passed a clot yesterday. Pt denies soaking through pad <30min.  reports pt took percocet 5mg and motrin at 7am PTA.

## 2020-11-04 NOTE — H&P
HISTORY AND PHYSICAL                                                OBSTETRICS          Subjective:       Cass Shipley is a 34 y/o  s/p  on 10/30/2020 after IOL for FGR who presents with acute onset of lower abdominal pain and cramping starting this morning after pumping at about 7:15 am.  She rates it at a 10/10 with pain worse on the left side. Delivery was uncomplicated with a short 2nd stage of labor and no lacerations. She reports passing out in the car due to the pain.  She reports normal post partum bleeding, with the passage of a golf-ball sized clot 2 days ago.  She reports nausea, no vomiting.  She denies fever, chills, or urinary symptoms.  She denies back pain or any unusual activity or trauma.  She denies similar episodes in the past.  Pain is somewhat relieved with pulling her legs up.  She took percocet and motrin this morning without relief.  She has had normal daily bowel movements since delivery, no diarrhea or rectal bleeding.       Review of Systems   Constitutional: Negative for chills and fever.   Respiratory: Negative for cough and shortness of breath.    Cardiovascular: Negative for chest pain and palpitations.   Gastrointestinal: Positive for abdominal pain and nausea. Negative for constipation, diarrhea and vomiting.   Genitourinary: Positive for vaginal pain. Negative for dysuria, urgency, vaginal bleeding and vaginal discharge.   Musculoskeletal: Negative for arthralgias.   Integumentary:  Negative for rash.   Neurological: Positive for syncope. Negative for headaches.       PMHx:   Past Medical History:   Diagnosis Date    Abnormal Pap smear     Allergy     Anxiety     GERD (gastroesophageal reflux disease)     Hemorrhoids without complication     Supraventricular tachycardia        PSHx:   Past Surgical History:   Procedure Laterality Date    CERVICAL BIOPSY  W/ LOOP ELECTRODE EXCISION      VAGINAL DELIVERY         All:   Review of patient's allergies  indicates:   Allergen Reactions    Vicodin [hydrocodone-acetaminophen]      ITCHING       Meds: (Not in a hospital admission)      SH:   Social History     Socioeconomic History    Marital status:      Spouse name: Not on file    Number of children: Not on file    Years of education: Not on file    Highest education level: Not on file   Occupational History     Employer: [employer]   Social Needs    Financial resource strain: Not on file    Food insecurity     Worry: Not on file     Inability: Not on file    Transportation needs     Medical: Not on file     Non-medical: Not on file   Tobacco Use    Smoking status: Current Some Day Smoker     Packs/day: 0.50     Years: 6.00     Pack years: 3.00    Smokeless tobacco: Never Used   Substance and Sexual Activity    Alcohol use: Yes     Alcohol/week: 2.0 standard drinks     Types: 2 Glasses of wine per week    Drug use: No    Sexual activity: Yes     Partners: Male     Birth control/protection: None   Lifestyle    Physical activity     Days per week: Not on file     Minutes per session: Not on file    Stress: Not on file   Relationships    Social connections     Talks on phone: Not on file     Gets together: Not on file     Attends Jewish service: Not on file     Active member of club or organization: Not on file     Attends meetings of clubs or organizations: Not on file     Relationship status: Not on file   Other Topics Concern    Not on file   Social History Narrative    Not on file       FH:   Family History   Problem Relation Age of Onset    Arrhythmia Mother     Breast cancer Neg Hx     Cancer Neg Hx     Colon cancer Neg Hx     Diabetes Neg Hx     Eclampsia Neg Hx     Hypertension Neg Hx     Miscarriages / Stillbirths Neg Hx     Ovarian cancer Neg Hx      labor Neg Hx     Stroke Neg Hx        OBHx:   OB History    Para Term  AB Living   3 2 2 0 1 2   SAB TAB Ectopic Multiple Live Births   1 0  0 0 2      # Outcome Date GA Lbr Timothy/2nd Weight Sex Delivery Anes PTL Lv   3 Term 10/29/20 37w0d  1.931 kg (4 lb 4.1 oz) F Vag-Spont EPI N QUE      Name: MARK HIDALGO      Apgar1: 9  Apgar5: 9   2 SAB 01/01/20        FD   1 Term 07/28/08    M Vag-Spont   QUE      Name: Gautam       Objective:       /67   Pulse 75   Temp 98.4 °F (36.9 °C) (Oral)   Resp 16   LMP 02/09/2020   SpO2 96%     Vitals:    11/04/20 0814 11/04/20 0815 11/04/20 0820 11/04/20 0830   BP:    138/67   Pulse:  97 88 75   Resp: 16      Temp:       TempSrc:       SpO2:  99% 100% 96%     Physical Exam     Vitals reviewed.  Constitutional: She appears well-developed and well-nourished. She appears distressed.   Cardiovascular: Normal rate.   Pulmonary/Chest: No respiratory distress.   Abdominal: Soft. She exhibits no distension. There is abdominal tenderness. There is no rebound and no guarding.   Moderate tenderness mid lower abdomen, L > R  No CVAT   Genitourinary:    Vagina normal.      Genitourinary Comments: Perineum intact, no lesions.  SSE with small amount of vaginal bleeding, cervix without lesions.  Bilateral adnexal tenderness and uterine fundal tenderness     Musculoskeletal: No tenderness or edema.   Neurological: She is alert and oriented to person, place, and time.   Skin: Skin is warm and dry.   Psychiatric: She has a normal mood and affect.     Recent Labs   Lab 10/29/20  0841 10/30/20  0411 11/04/20  0746   WBC 15.10* 22.98* 16.50*   HGB 11.9* 10.8* 12.5   HCT 36.6* 32.4* 39.5   MCV 96 94 98   * 383* 592*          Imaging Results                   CT Abdomen Pelvis With Contrast (Final result)  Result time 11/04/20 11:01:08                Final result by Don Lamb MD (11/04/20 11:01:08)                               Impression:        Enlarged uterus with heterogeneous enhancement consistent with patient's recent pregnancy.     Two liver masses are identified demonstrating similar enhancement patterns and  likely representing benign hepatic hemangiomas.  One of these was present on a prior study dated 06/01/2004.  The other was not as well delineated on the prior examination likely due to lack of contrast on the prior study        Electronically signed by:       Don Lamb MD  Date:                                                11/04/2020  Time:                                                11:01                         Narrative:     EXAMINATION:  CT ABDOMEN PELVIS WITH CONTRAST     CLINICAL HISTORY:  abdominal pain post op;     TECHNIQUE:  Low dose axial images, sagittal and coronal reformations were obtained from the lung bases to the pubic symphysis following the IV administration of 75 mL of Omnipaque 350 .  Oral contrast was not given.     COMPARISON:  CT renal stone study dated 06/01/2004.     FINDINGS:  There are mild dependent atelectatic changes within the lung bases.  Bony structures are intact without evidence for acute fracture or bone destruction.     The liver is normal in overall size.  There is a heterogeneously enhancing low-density mass within the lateral aspect of the right lobe of the liver measuring 1.3 cm in size (image 24, series 2) and this was likely present on the prior examination dated 06/01/2004.  This likely represents a benign hemangioma.  A similar appearing mass measuring 2.2 cm in size is noted within the lateral segment of the left lobe (image 27, series 2) although this mass is not confidently identified on the prior noncontrast study.  The gallbladder is present and appears unremarkable.  There is no evidence for intrahepatic or extrahepatic biliary dilatation.  The spleen, stomach, and pancreas are unremarkable.  The adrenal glands are not enlarged.  The kidneys are normal in size without evidence for abnormal renal masses or hydronephrosis.  The kidneys are noted to concentrate contrast symmetrically.  There is minimal atherosclerotic calcification within the abdominal  aorta without aneurysmal dilatation.  No para-aortic lymphadenopathy is identified.  The appendix is not confidently identified.  No dilated loops of bowel are evident.  The uterus is enlarged with heterogeneous enhancement consistent with recent postpartum state.  No abnormal adnexal masses are identified.  No free fluid is identified within the pelvis.  The urinary bladder is decompressed.  There is no evidence for pelvic or inguinal lymphadenopathy.                                        Assessment:       37w0d weeks gestation with endo    Active Hospital Problems    Diagnosis  POA    Lower abdominal pain [R10.30]  Yes      Resolved Hospital Problems   No resolved problems to display.          Plan:     1. Post partum endometritis  - patient PPD#5 s/p    - significant uterine tenderness on exam somewhat relieved by IV diluadid   - given significant pain on presentation CT scan was performed with benign findings   - continued uterine tenderness on exam   - leukocytosis to 16   - afebrile   - will admit for treatment of endometritis       2. S/p    - routine post partum care   - ibuprofen and norco for analgesia   - lactation consult PRN       Mara Barriga MD  OBGYN, PGY-3

## 2020-11-04 NOTE — ED NOTES
Pt brought to [facility] on transport monitor by nurse. Pt stable for transport. Ok for pt to be brought per Dr. Selby

## 2020-11-04 NOTE — PHYSICIAN QUERY
-PT Name: Cass Shipley  MR #: [id number]     OB HEMATOLOGICAL CLARIFICATION     CDS/: ANIBAL Damian,RNC-[email or web address]         Contact information:[email or web address]  This form is a permanent document in the medical record.    Query Date: November 4, 2020  By submitting this query, we are merely seeking further clarification of documentation. Please utilize your independent clinical judgment when addressing the question(s) below.      Indicators Supporting Clinical Findings Location in Medical Record   X Documentation of uterine atony with bleeding, post-partum bleeding, or hemorrhage noting uterine atony with bleeding L&D Delivery note 10/30    Documentation of retained placenta     X Delivery type with EBL Normal spontaneous vaginal delivery of live infant after pushing for approximately 3 minutes    Calculated QBL (mL):300 L&D Delivery note 10/30   X H/H Hgb=11.9-->10.8  Hct=36.6-->32.4 LAB 10/29-10/30    Vital signs     X Medications   Treatment IV pitocin given   cytotec IN given w/ improvement of tone and bleeding L&D Delivery note 10/30    Blood transfusion      Other          Provider, please specify the diagnosis or diagnoses associated with the above clinical findings:      [   ] Immediate post-partum hemorrhage   [ x  ] Uterine atony without hemorrhage   [   ] Other hematological diagnosis (please specify): _________________   [  ] Clinically undetermined        Please document in your progress notes daily for the duration of treatment, until resolved, and include in your discharge summary.

## 2020-11-04 NOTE — ED PROVIDER NOTES
Encounter Date: 2020    SCRIBE #1 NOTE: Shoshana HERNANDEZ am scribing for, and in the presence of, Dr. Selby.       History     Chief Complaint   Patient presents with    Abdominal Pain     +Acute lower abd pain x1 hour PTA, with syncopal episode en route to ED.  on 10/29. Passed large clot yesterday.     Time seen by provider: 7:36 AM    This is a 35 y.o. female, , 5 days postpartum, who presents with complaint of sudden onset lower abdominal cramping that began 1 hour ago. Patient's  reports patient had 2 syncopal episodes en route to ER. Patient reports vaginal bleeding currently, and  notespatient passed a large blood clot yesterday.  states patient took a percocet and motrin at 7 AM this morning. Patient states there was no complications with her vaginal delivery 4 days ago.  does note the baby is currently in the NICU because the baby had a platelet problem and required a blood transfusion.  reports an allergy to Vicodin.     The history is provided by the patient and the spouse.     Review of patient's allergies indicates:   Allergen Reactions    Vicodin [hydrocodone-acetaminophen]      ITCHING     Past Medical History:   Diagnosis Date    Abnormal Pap smear     Allergy     Anxiety     GERD (gastroesophageal reflux disease)     Hemorrhoids without complication     Supraventricular tachycardia      Past Surgical History:   Procedure Laterality Date    CERVICAL BIOPSY  W/ LOOP ELECTRODE EXCISION      VAGINAL DELIVERY       Family History   Problem Relation Age of Onset    Arrhythmia Mother     Breast cancer Neg Hx     Cancer Neg Hx     Colon cancer Neg Hx     Diabetes Neg Hx     Eclampsia Neg Hx     Hypertension Neg Hx     Miscarriages / Stillbirths Neg Hx     Ovarian cancer Neg Hx      labor Neg Hx     Stroke Neg Hx      Social History     Tobacco Use    Smoking status: Current Some Day Smoker     Packs/day: 0.50     Years: 6.00     Pack  years: 3.00    Smokeless tobacco: Never Used   Substance Use Topics    Alcohol use: Yes     Alcohol/week: 2.0 standard drinks     Types: 2 Glasses of wine per week    Drug use: No     Review of Systems   Unable to perform ROS: Acuity of condition   Constitutional: Negative for fever.   Gastrointestinal: Positive for abdominal pain.   Genitourinary: Positive for vaginal bleeding.   Neurological: Positive for syncope.       Physical Exam     Initial Vitals   BP Pulse Resp Temp SpO2   11/04/20 0742 11/04/20 0742 11/04/20 0744 11/04/20 0800 11/04/20 0742   (!) 119/101 100 (!) 28 98.4 °F (36.9 °C) 100 %      MAP       --                Physical Exam    Nursing note and vitals reviewed.  Constitutional: She appears well-developed and well-nourished. She appears distressed.   Patient distressed from pain   HENT:   Head: Normocephalic and atraumatic.   Right Ear: External ear normal.   Left Ear: External ear normal.   Eyes: Conjunctivae and EOM are normal. Pupils are equal, round, and reactive to light. Right eye exhibits no discharge. Left eye exhibits no discharge. No scleral icterus.   Neck: Normal range of motion. Neck supple.   Cardiovascular: Normal rate, regular rhythm and normal heart sounds. Exam reveals no gallop and no friction rub.    No murmur heard.  Pulmonary/Chest: Breath sounds normal. No stridor. No respiratory distress. She has no wheezes. She has no rhonchi. She has no rales.   Abdominal: Soft. Bowel sounds are normal. She exhibits no distension and no mass. There is abdominal tenderness. There is no rebound and no guarding.   Diffuse lower abdominal tenderness worse over fundus   Musculoskeletal: No edema.   Neurological: She is alert and oriented to person, place, and time. She has normal strength. No cranial nerve deficit or sensory deficit. GCS score is 15. GCS eye subscore is 4. GCS verbal subscore is 5. GCS motor subscore is 6.   Skin: Skin is warm and dry. Capillary refill takes less than 2  seconds.   Psychiatric: She has a normal mood and affect. Her behavior is normal. Judgment and thought content normal.         ED Course   Procedures  Labs Reviewed   CBC W/ AUTO DIFFERENTIAL - Abnormal; Notable for the following components:       Result Value    WBC 16.50 (*)     MCH 31.1 (*)     MCHC 31.6 (*)     Platelets 592 (*)     MPV 9.1 (*)     Immature Granulocytes 0.6 (*)     Gran # (ANC) 11.3 (*)     Immature Grans (Abs) 0.10 (*)     All other components within normal limits   COMPREHENSIVE METABOLIC PANEL - Abnormal; Notable for the following components:    Chloride 113 (*)     CO2 20 (*)     Albumin 3.0 (*)     Alkaline Phosphatase 159 (*)     All other components within normal limits   URINALYSIS, REFLEX TO URINE CULTURE - Abnormal; Notable for the following components:    Occult Blood UA Trace (*)     All other components within normal limits    Narrative:     Specimen Source->Urine   PROTIME-INR   SARS-COV-2 RNA AMPLIFICATION, QUAL   TYPE & SCREEN   ANTIBODY IDENTIFICATION   POST PARTUM TYPE AND SCREEN   FERNANDO - [facility] (FETAL BLEED SCREEN)          Imaging Results          CT Abdomen Pelvis With Contrast (Final result)  Result time 11/04/20 11:01:08    Final result by Don Lamb MD (11/04/20 11:01:08)                 Impression:      Enlarged uterus with heterogeneous enhancement consistent with patient's recent pregnancy.    Two liver masses are identified demonstrating similar enhancement patterns and likely representing benign hepatic hemangiomas.  One of these was present on a prior study dated 06/01/2004.  The other was not as well delineated on the prior examination likely due to lack of contrast on the prior study      Electronically signed by: Don Lamb MD  Date:    11/04/2020  Time:    11:01             Narrative:    EXAMINATION:  CT ABDOMEN PELVIS WITH CONTRAST    CLINICAL HISTORY:  abdominal pain post op;    TECHNIQUE:  Low dose axial images, sagittal and coronal reformations were  obtained from the lung bases to the pubic symphysis following the IV administration of 75 mL of Omnipaque 350 .  Oral contrast was not given.    COMPARISON:  CT renal stone study dated 06/01/2004.    FINDINGS:  There are mild dependent atelectatic changes within the lung bases.  Bony structures are intact without evidence for acute fracture or bone destruction.    The liver is normal in overall size.  There is a heterogeneously enhancing low-density mass within the lateral aspect of the right lobe of the liver measuring 1.3 cm in size (image 24, series 2) and this was likely present on the prior examination dated 06/01/2004.  This likely represents a benign hemangioma.  A similar appearing mass measuring 2.2 cm in size is noted within the lateral segment of the left lobe (image 27, series 2) although this mass is not confidently identified on the prior noncontrast study.  The gallbladder is present and appears unremarkable.  There is no evidence for intrahepatic or extrahepatic biliary dilatation.  The spleen, stomach, and pancreas are unremarkable.  The adrenal glands are not enlarged.  The kidneys are normal in size without evidence for abnormal renal masses or hydronephrosis.  The kidneys are noted to concentrate contrast symmetrically.  There is minimal atherosclerotic calcification within the abdominal aorta without aneurysmal dilatation.  No para-aortic lymphadenopathy is identified.  The appendix is not confidently identified.  No dilated loops of bowel are evident.  The uterus is enlarged with heterogeneous enhancement consistent with recent postpartum state.  No abnormal adnexal masses are identified.  No free fluid is identified within the pelvis.  The urinary bladder is decompressed.  There is no evidence for pelvic or inguinal lymphadenopathy.                                 Medical Decision Making:   History:   Old Medical Records: I decided to obtain old medical records.  Initial Assessment:    35-year-old female recently postpartum with severe acute onset lower abdominal pain today.  Patient was discussed Dr. Corado from obstetric some will be transported to OB ED.  IV placed and analgesia initiated due to severity of symptoms.    Differential Diagnosis:   Postpartum hemorrhage, endometritis, colitis, urinary retention, urinary tract infection  Clinical Tests:   Lab Tests: Ordered and Reviewed            Scribe Attestation:   Scribe #1: I performed the above scribed service and the documentation accurately describes the services I performed. I attest to the accuracy of the note.    Attending Attestation:           Physician Attestation for Scribe:  Physician Attestation Statement for Scribe #1: I, Dr. Selby, reviewed documentation, as scribed by Shoshana Fernandes in my presence, and it is both accurate and complete.                 ED Course as of Nov 04 1359   Wed Nov 04, 2020   1039 S/p CT, await radiologist read  Patient reports mild cramping.   Labs reviewed with patient and spouse    [AR]   1124 Discussed CT results with patient, stable liver hemangioma, o/w unremarkable. Repeat exam with significant fundal tenderness, will admit of IV antibiotics for presumed endometritis.     [AR]      ED Course User Index  [AR] Rosalie Ybarra MD            Clinical Impression:     ICD-10-CM ICD-9-CM   1. Lower abdominal pain  R10.30 789.09   2. Pelvic pain  R10.2 [id number]   3. Endometritis following delivery  O86.12 670.12                          ED Disposition Condition    Send to L&D                             Clementina Selby MD  11/04/20 7214

## 2020-11-04 NOTE — PLAN OF CARE
Problem: Adult Inpatient Plan of Care  Goal: Plan of Care Review  Outcome: Ongoing, Progressing  Goal: Patient-Specific Goal (Individualization)  Outcome: Ongoing, Progressing  Goal: Absence of Hospital-Acquired Illness or Injury  Outcome: Ongoing, Progressing  Goal: Optimal Comfort and Wellbeing  Outcome: Ongoing, Progressing  Goal: Readiness for Transition of Care  Outcome: Ongoing, Progressing  Goal: Rounds/Family Conference  Outcome: Ongoing, Progressing     PP readmit for endometritis. IV abx, VSS monitoring.

## 2020-11-05 LAB
BASOPHILS # BLD AUTO: 0.07 K/UL (ref 0–0.2)
BASOPHILS NFR BLD: 0.6 % (ref 0–1.9)
DIFFERENTIAL METHOD: ABNORMAL
EOSINOPHIL # BLD AUTO: 0.3 K/UL (ref 0–0.5)
EOSINOPHIL NFR BLD: 2.4 % (ref 0–8)
ERYTHROCYTE [DISTWIDTH] IN BLOOD BY AUTOMATED COUNT: 12.8 % (ref 11.5–14.5)
HCT VFR BLD AUTO: 34.7 % (ref 37–48.5)
HGB BLD-MCNC: 11 G/DL (ref 12–16)
IMM GRANULOCYTES # BLD AUTO: 0.06 K/UL (ref 0–0.04)
IMM GRANULOCYTES NFR BLD AUTO: 0.5 % (ref 0–0.5)
LYMPHOCYTES # BLD AUTO: 3.4 K/UL (ref 1–4.8)
LYMPHOCYTES NFR BLD: 28.9 % (ref 18–48)
MCH RBC QN AUTO: 31 PG (ref 27–31)
MCHC RBC AUTO-ENTMCNC: 31.7 G/DL (ref 32–36)
MCV RBC AUTO: 98 FL (ref 82–98)
MONOCYTES # BLD AUTO: 0.8 K/UL (ref 0.3–1)
MONOCYTES NFR BLD: 6.6 % (ref 4–15)
NEUTROPHILS # BLD AUTO: 7.2 K/UL (ref 1.8–7.7)
NEUTROPHILS NFR BLD: 61 % (ref 38–73)
NRBC BLD-RTO: 0 /100 WBC
PLATELET # BLD AUTO: 478 K/UL (ref 150–350)
PMV BLD AUTO: 9.6 FL (ref 9.2–12.9)
RBC # BLD AUTO: 3.55 M/UL (ref 4–5.4)
WBC # BLD AUTO: 11.73 K/UL (ref 3.9–12.7)

## 2020-11-05 PROCEDURE — 63600175 PHARM REV CODE 636 W HCPCS: Performed by: STUDENT IN AN ORGANIZED HEALTH CARE EDUCATION/TRAINING PROGRAM

## 2020-11-05 PROCEDURE — 85025 COMPLETE CBC W/AUTO DIFF WBC: CPT

## 2020-11-05 PROCEDURE — 25000003 PHARM REV CODE 250: Performed by: STUDENT IN AN ORGANIZED HEALTH CARE EDUCATION/TRAINING PROGRAM

## 2020-11-05 PROCEDURE — 11000001 HC ACUTE MED/SURG PRIVATE ROOM

## 2020-11-05 PROCEDURE — 96376 TX/PRO/DX INJ SAME DRUG ADON: CPT

## 2020-11-05 PROCEDURE — 36415 COLL VENOUS BLD VENIPUNCTURE: CPT

## 2020-11-05 PROCEDURE — G0378 HOSPITAL OBSERVATION PER HR: HCPCS

## 2020-11-05 RX ADMIN — CLINDAMYCIN IN 5 PERCENT DEXTROSE 900 MG: 18 INJECTION, SOLUTION INTRAVENOUS at 03:11

## 2020-11-05 RX ADMIN — PRENATAL VIT W/ FE FUMARATE-FA TAB 27-0.8 MG 1 TABLET: 27-0.8 TAB at 09:11

## 2020-11-05 RX ADMIN — IBUPROFEN 600 MG: 600 TABLET, FILM COATED ORAL at 11:11

## 2020-11-05 RX ADMIN — CLINDAMYCIN IN 5 PERCENT DEXTROSE 900 MG: 18 INJECTION, SOLUTION INTRAVENOUS at 09:11

## 2020-11-05 RX ADMIN — GENTAMICIN SULFATE 283.2 MG: 40 INJECTION, SOLUTION INTRAMUSCULAR; INTRAVENOUS at 04:11

## 2020-11-05 RX ADMIN — OXYCODONE HYDROCHLORIDE AND ACETAMINOPHEN 1 TABLET: 5; 325 TABLET ORAL at 03:11

## 2020-11-05 RX ADMIN — IBUPROFEN 600 MG: 600 TABLET, FILM COATED ORAL at 03:11

## 2020-11-05 RX ADMIN — OXYCODONE HYDROCHLORIDE AND ACETAMINOPHEN 1 TABLET: 10; 325 TABLET ORAL at 07:11

## 2020-11-05 RX ADMIN — DOCUSATE SODIUM 200 MG: 100 CAPSULE, LIQUID FILLED ORAL at 09:11

## 2020-11-05 RX ADMIN — IBUPROFEN 600 MG: 600 TABLET, FILM COATED ORAL at 09:11

## 2020-11-05 RX ADMIN — OXYCODONE HYDROCHLORIDE AND ACETAMINOPHEN 1 TABLET: 5; 325 TABLET ORAL at 07:11

## 2020-11-05 NOTE — PROGRESS NOTES
POSTPARTUM PROGRESS NOTE    HPI:  Cass Shipley is a 36 y/o  s/p  on 10/30/2020 after IOL for FGR who presents with acute onset of lower abdominal pain and cramping starting this morning after pumping at about 7:15 am.  She rates it at a 10/10 with pain worse on the left side. Delivery was uncomplicated with a short 2nd stage of labor and no lacerations. She reports passing out in the car due to the pain.  She reports normal post partum bleeding, with the passage of a golf-ball sized clot 2 days ago.  She reports nausea, no vomiting.  She denies fever, chills, or urinary symptoms.  She denies back pain or any unusual activity or trauma.  She denies similar episodes in the past.  Pain is somewhat relieved with pulling her legs up.  She took percocet and motrin this morning without relief.  She has had normal daily bowel movements since delivery, no diarrhea or rectal bleeding.     Hospital Course:  2020: Admitted for severe fundal tenderness. CT A/P wnl. Started on gent/clinda with plan for 48 hour regimen.   2020: NAEON. Remains afebrile. Abd pain improving with IV antibiotics and PRN pain medications. Denies foul lochia. Bleeding mild. Denies subjective fevers/ chills. Ambulating and tolerating PO intake.     Objective:       Temp:  [97.4 °F (36.3 °C)-98.4 °F (36.9 °C)] 98 °F (36.7 °C)  Pulse:  [72-95] 95  Resp:  [17-18] 18  SpO2:  [96 %-99 %] 97 %  BP: (105-130)/(58-73) 130/72    General:   alert, appears stated age and cooperative   Lungs:   Non-labored respirations    Heart:   regular rate and rhythm   Abdomen:  Soft, nondistended    Uterus:  firm located at the umblicus, TTP over uterine fundus.     Extremities: no pedal edema noted     Lab Review  Recent Labs   Lab 10/30/20  0411 20  0746 20  0428   WBC 22.98* 16.50* 11.73   HGB 10.8* 12.5 11.0*   HCT 32.4* 39.5 34.7*   MCV 94 98 98   * 592* 478*        Recent Labs   Lab 20  0746      K 3.8   *    CO2 20*   BUN 13   CREATININE 0.6   GLU 95   PROT 6.9   BILITOT 0.2   ALKPHOS 159*   ALT 28   AST 23          I/O  No intake or output data in the 24 hours ending 20 6361     Assessment:     Patient Active Problem List   Diagnosis    Syncope    Palpitations    SVT (supraventricular tachycardia)    Smoker unmotivated to quit    Vaginitis    Sinusitis    Exposure to STD    Pre-ulcerative corn or callous    Vaginosis    Breast mass in female    Rh negative state in antepartum period    Hemorrhoids during pregnancy in third trimester    Multigravida of advanced maternal age in third trimester    Intrauterine growth restriction (IUGR) affecting care of mother, third trimester, single gestation     (spontaneous vaginal delivery)    Lower abdominal pain        Plan:   1. Post partum endometritis  - patient admitted on PPD#5 s/p    - significant uterine tenderness on exam somewhat relieved by IV diluadid on admit, tenderness now improving but still present  - given significant pain on presentation CT scan was performed with benign findings   - leukocytosis to 16 on admit> down to 11 this am  - afebrile throughout stay  - admitted for IV gent/clinda x48 hours for treatment of endometritis  - Pain clinically improving with current tc  - PRN ibuprofen and norco    2. S/p    - routine post partum care   - ibuprofen and norco for analgesia   - lactation consult PRN        Dispo: As patient meets milestones, will plan to discharge as symptoms improve.    Augustina Jimenez M.D.   OB/GYN  PGY-2

## 2020-11-05 NOTE — PLAN OF CARE
Problem: Adult Inpatient Plan of Care  Goal: Plan of Care Review  Outcome: Ongoing, Progressing  Goal: Patient-Specific Goal (Individualization)  Outcome: Ongoing, Progressing  Goal: Absence of Hospital-Acquired Illness or Injury  Outcome: Ongoing, Progressing  Goal: Optimal Comfort and Wellbeing  Outcome: Ongoing, Progressing  Goal: Readiness for Transition of Care  Outcome: Ongoing, Progressing  Goal: Rounds/Family Conference  Outcome: Ongoing, Progressing     ABX, VSS monitoring.

## 2020-11-05 NOTE — PLAN OF CARE
Problem: Adult Inpatient Plan of Care  Goal: Plan of Care Review  Outcome: Ongoing, Progressing  Goal: Patient-Specific Goal (Individualization)  Outcome: Ongoing, Progressing  Goal: Absence of Hospital-Acquired Illness or Injury  Outcome: Ongoing, Progressing  Goal: Optimal Comfort and Wellbeing  Outcome: Ongoing, Progressing  Goal: Readiness for Transition of Care  Outcome: Ongoing, Progressing  Goal: Rounds/Family Conference  Outcome: Ongoing, Progressing     Patient continues to heal from birthing process and current diagnosis of endometritis. Pain control is of high priority, as well as getting scheduled antibiotics for infection. VS have been stable and pain has been well controled,. Will monitor

## 2020-11-06 VITALS
DIASTOLIC BLOOD PRESSURE: 68 MMHG | TEMPERATURE: 99 F | RESPIRATION RATE: 16 BRPM | SYSTOLIC BLOOD PRESSURE: 118 MMHG | OXYGEN SATURATION: 97 % | BODY MASS INDEX: 24.6 KG/M2 | WEIGHT: 138.88 LBS | HEART RATE: 88 BPM

## 2020-11-06 PROCEDURE — 96376 TX/PRO/DX INJ SAME DRUG ADON: CPT

## 2020-11-06 PROCEDURE — 25000003 PHARM REV CODE 250: Performed by: STUDENT IN AN ORGANIZED HEALTH CARE EDUCATION/TRAINING PROGRAM

## 2020-11-06 PROCEDURE — G0378 HOSPITAL OBSERVATION PER HR: HCPCS

## 2020-11-06 RX ORDER — SIMETHICONE 80 MG
80 TABLET,CHEWABLE ORAL EVERY 6 HOURS PRN
Refills: 0 | COMMUNITY
Start: 2020-11-06 | End: 2020-12-17

## 2020-11-06 RX ADMIN — OXYCODONE HYDROCHLORIDE AND ACETAMINOPHEN 1 TABLET: 5; 325 TABLET ORAL at 09:11

## 2020-11-06 RX ADMIN — IBUPROFEN 600 MG: 600 TABLET, FILM COATED ORAL at 01:11

## 2020-11-06 RX ADMIN — CLINDAMYCIN IN 5 PERCENT DEXTROSE 900 MG: 18 INJECTION, SOLUTION INTRAVENOUS at 03:11

## 2020-11-06 RX ADMIN — CLINDAMYCIN IN 5 PERCENT DEXTROSE 900 MG: 18 INJECTION, SOLUTION INTRAVENOUS at 09:11

## 2020-11-06 RX ADMIN — IBUPROFEN 600 MG: 600 TABLET, FILM COATED ORAL at 05:11

## 2020-11-06 NOTE — DISCHARGE SUMMARY
Postpartum Discharge Summary  Obstetrics      Primary OB Clinician: Brandyn Roche IV MD     Admission date: 2020  Discharge date: 2020    Disposition: To home, self care    Discharge Diagnosis List:      Patient Active Problem List   Diagnosis    Syncope    Palpitations    SVT (supraventricular tachycardia)    Smoker unmotivated to quit    Vaginitis    Sinusitis    Exposure to STD    Pre-ulcerative corn or callous    Vaginosis    Breast mass in female    Rh negative state in antepartum period    Hemorrhoids during pregnancy in third trimester    Multigravida of advanced maternal age in third trimester    Intrauterine growth restriction (IUGR) affecting care of mother, third trimester, single gestation     (spontaneous vaginal delivery)    Lower abdominal pain    Endometritis following delivery       Hospital Course:  Cass Shipley is a 35 y.o. now  s/p  on 10/30/2020 after IOL for FGR who presents with acute onset of lower abdominal pain and cramping who was admitted for postpartum endometritis.     Hospital Course:  2020: Admitted for severe fundal tenderness. CT A/P wnl. Started on gent/clinda with plan for 48 hour regimen.   2020: NAEON. Remains afebrile. Abd pain improving with IV antibiotics and PRN pain medications. Denies foul lochia. Bleeding mild. Denies subjective fevers/ chills. Ambulating and tolerating PO intake.   2020: NAEON, remains afebrile. Patient is now s/p 48 hours IV antibiotics. Pain greatly improved. Denies foul lochia. Bleeding mild. Denies subjective fevers/ chills. Ambulating and tolerating PO intake.    On discharge day, patient's pain is controlled with oral pain medications. Pt is tolerating ambulation without SOB or CP, and regular diet without N/V. Denies any HA, vision changes, F/C, LE swelling. Denies any breast pain/soreness.    Pt in stable condition and ready for discharge. She has been instructed to start and/or  continue medications and follow up with her obstetrics provider as listed below.    Pertinent studies:  CBC  Recent Labs   Lab 11/04/20  0746 11/05/20  0428   WBC 16.50* 11.73   HGB 12.5 11.0*   HCT 39.5 34.7*   MCV 98 98   * 478*          Immunization History   Administered Date(s) Administered    Influenza - Quadrivalent - PF *Preferred* (6 months and older) 10/15/2020    Rho (D) Immune Globulin 01/08/2020, 09/01/2020    Rho (D) Immune Globulin - IM 10/30/2020    Tdap 09/01/2020        Delivery:    Episiotomy: None   Lacerations: None   Repair suture: None   Repair # of packets:     Blood loss (ml):       Birth information:  YOB: 2021   Time of birth: 11:31 PM   Sex: female   Delivery type: Vaginal, Spontaneous   Gestational Age: 37w0d    Delivery Clinician:      Other providers:       Additional  information:  Forceps:    Vacuum:    Breech:    Observed anomalies      Living?:           APGARS  One minute Five minutes Ten minutes   Skin color:         Heart rate:         Grimace:         Muscle tone:         Breathing:         Totals: 9  9        Placenta: Delivered:       appearance      Patient Instructions:   Discharge Medication List as of 11/6/2020 12:58 PM      START taking these medications    Details   simethicone (MYLICON) 80 MG chewable tablet Take 1 tablet (80 mg total) by mouth every 6 (six) hours as needed for Flatulence., Starting Fri 11/6/2020, OTC         CONTINUE these medications which have NOT CHANGED    Details   docusate sodium (COLACE) 50 MG capsule Take by mouth 2 (two) times daily., Historical Med      ferrous sulfate (FEOSOL) 325 mg (65 mg iron) Tab tablet Take 325 mg by mouth daily with breakfast., Historical Med      HYDROcodone-acetaminophen (NORCO) 5-325 mg per tablet Take 1 tablet by mouth every 6 (six) hours as needed for Pain., Starting Fri 10/30/2020, Print      hydrocortisone-pramoxine (ANALPRAM-HC) 2.5-1 % Crea Place rectally 3 (three) times daily.,  Starting Thu 6/11/2020, Normal      ibuprofen (ADVIL,MOTRIN) 600 MG tablet Take 1 tablet (600 mg total) by mouth every 6 (six) hours as needed for Pain., Starting Fri 10/30/2020, Normal      oxyCODONE-acetaminophen (PERCOCET) 5-325 mg per tablet Take 1 tablet by mouth every 4 (four) hours as needed., Starting Sat 10/31/2020, Normal      prenatal vit-iron fum-folic ac (PRENATAL TABLET) 28 mg iron- 800 mcg Tab Take 1 tablet by mouth once daily., Starting Tue 12/31/2019, Until Wed 12/30/2020, OTC             Discharge Procedure Orders   Diet Adult Regular     No driving until:   Order Comments: Until not taking narcotic pain medication.     Notify your health care provider if you experience any of the following:  temperature >100.4     Notify your health care provider if you experience any of the following:  persistent nausea and vomiting or diarrhea     Notify your health care provider if you experience any of the following:  severe uncontrolled pain     Notify your health care provider if you experience any of the following:  redness, tenderness, or signs of infection (pain, swelling, redness, odor or green/yellow discharge around incision site)     Notify your health care provider if you experience any of the following:  difficulty breathing or increased cough     Notify your health care provider if you experience any of the following:  severe persistent headache     Notify your health care provider if you experience any of the following:  worsening rash     Notify your health care provider if you experience any of the following:  persistent dizziness, light-headedness, or visual disturbances     Notify your health care provider if you experience any of the following:  increased confusion or weakness     Notify your health care provider if you experience any of the following:   Order Comments: Heavy vaginal bleeding soaking through 2 pads/hr for 2 consecutive hours.     Pelvic Rest   Order Comments: Pelvic rest for  at least 6 weeks. Nothing in vagina - no sex, tampons, or douching for 6 weeks     Activity as tolerated       Follow-up Information     Brandyn Roche Iv, MD In 1 week.    Specialties: Obstetrics, Obstetrics and Gynecology  Why: Endometritis follow up  Contact information:  [address] [address] [phone]                    Domingo Gaytan M.D.  OB/GYN PGY-1

## 2020-11-06 NOTE — PLAN OF CARE
VSS; Patient ambulating and voiding adequately. Pain well controlled with PO pain medications. Discharge papers reviewed. Patient to follow up in one week for endometritis. All questions answered. Patient to be discharged to home care.     Problem: Adult Inpatient Plan of Care  Goal: Plan of Care Review  Outcome: Met  Goal: Patient-Specific Goal (Individualization)  Outcome: Met  Goal: Absence of Hospital-Acquired Illness or Injury  Outcome: Met  Goal: Optimal Comfort and Wellbeing  Outcome: Met  Goal: Readiness for Transition of Care  Outcome: Met  Goal: Rounds/Family Conference  Outcome: Met

## 2020-11-06 NOTE — PROGRESS NOTES
POSTPARTUM PROGRESS NOTE    HPI:  Cass Shipley is a 34 y/o  s/p  on 10/30/2020 after IOL for FGR who presents with acute onset of lower abdominal pain and cramping starting this morning after pumping at about 7:15 am.  She rates it at a 10/10 with pain worse on the left side. Delivery was uncomplicated with a short 2nd stage of labor and no lacerations. She reports passing out in the car due to the pain.  She reports normal post partum bleeding, with the passage of a golf-ball sized clot 2 days ago.  She reports nausea, no vomiting.  She denies fever, chills, or urinary symptoms.  She denies back pain or any unusual activity or trauma.  She denies similar episodes in the past.  Pain is somewhat relieved with pulling her legs up.  She took percocet and motrin this morning without relief.  She has had normal daily bowel movements since delivery, no diarrhea or rectal bleeding.     Hospital Course:  2020: Admitted for severe fundal tenderness. CT A/P wnl. Started on gent/clinda with plan for 48 hour regimen.   2020: NAEON. Remains afebrile. Abd pain improving with IV antibiotics and PRN pain medications. Denies foul lochia. Bleeding mild. Denies subjective fevers/ chills. Ambulating and tolerating PO intake.   2020: NAEON, remains afebrile. Patient is now s/p 48 hours IV antibiotics. Pain greatly improved. Denies foul lochia. Bleeding mild. Denies subjective fevers/ chills. Ambulating and tolerating PO intake.       Objective:       Temp:  [98 °F (36.7 °C)-98.6 °F (37 °C)] 98 °F (36.7 °C)  Pulse:  [75-95] 75  Resp:  [17-19] 17  SpO2:  [94 %-97 %] 97 %  BP: ()/(53-72) 127/67    General:   alert, appears stated age and cooperative   Lungs:   Non-labored respirations    Heart:   regular rate and rhythm   Abdomen:  Soft, nondistended    Uterus:  firm located at the umblicus, no uterine fundus tenderness.     Extremities: no pedal edema noted     Lab Review  Recent Labs   Lab  20  0746 20  0428   WBC 16.50* 11.73   HGB 12.5 11.0*   HCT 39.5 34.7*   MCV 98 98   * 478*        Recent Labs   Lab 20  0746      K 3.8   *   CO2 20*   BUN 13   CREATININE 0.6   GLU 95   PROT 6.9   BILITOT 0.2   ALKPHOS 159*   ALT 28   AST 23          I/O  No intake or output data in the 24 hours ending 20 0914     Assessment:     Patient Active Problem List   Diagnosis    Syncope    Palpitations    SVT (supraventricular tachycardia)    Smoker unmotivated to quit    Vaginitis    Sinusitis    Exposure to STD    Pre-ulcerative corn or callous    Vaginosis    Breast mass in female    Rh negative state in antepartum period    Hemorrhoids during pregnancy in third trimester    Multigravida of advanced maternal age in third trimester    Intrauterine growth restriction (IUGR) affecting care of mother, third trimester, single gestation     (spontaneous vaginal delivery)    Lower abdominal pain        Plan:   1. Post partum endometritis  - patient admitted on PPD#7s/p    - significant uterine tenderness on exam somewhat relieved by IV diluadid on admit, tenderness now improved  - given significant pain on presentation CT scan was performed with benign findings   - leukocytosis to 16 on admit> down to 11 this am  - afebrile throughout stay  - s/p 48h IV gent/clinda for treatment of endometritis  - Pain clinically improved  - PRN ibuprofen and norco    2. S/p    - routine post partum care   - ibuprofen and norco for analgesia   - lactation consult PRN        Dispo: As patient meets milestones, will plan to discharge as symptoms improve.      Domingo Gaytan M.D.  OB/GYN PGY-1

## 2020-11-19 ENCOUNTER — PATIENT MESSAGE (OUTPATIENT)
Dept: ADMINISTRATIVE | Facility: OTHER | Age: 35
End: 2020-11-19

## 2020-11-23 ENCOUNTER — TELEPHONE (OUTPATIENT)
Dept: OBSTETRICS AND GYNECOLOGY | Facility: CLINIC | Age: 35
End: 2020-11-23

## 2020-11-23 NOTE — TELEPHONE ENCOUNTER
Lm for pt that PP appt scheduled for 12/17/20 at 10:45am. And to cb with any other questions she may have.

## 2020-11-23 NOTE — TELEPHONE ENCOUNTER
----- Message from Caridad Brooks sent at 11/23/2020  4:40 PM CST -----  Patient is looking to schedule her 6 week pp, but everything is booked was wondering if you can call her back to get her scheduled also has questions on when can she shower ect relating to her delivery she can be contacted at [phone]        Julia ARRINGTON

## 2020-12-17 ENCOUNTER — POSTPARTUM VISIT (OUTPATIENT)
Dept: OBSTETRICS AND GYNECOLOGY | Facility: CLINIC | Age: 35
End: 2020-12-17
Payer: COMMERCIAL

## 2020-12-17 VITALS
WEIGHT: 130.31 LBS | DIASTOLIC BLOOD PRESSURE: 66 MMHG | HEIGHT: 64 IN | SYSTOLIC BLOOD PRESSURE: 108 MMHG | BODY MASS INDEX: 22.25 KG/M2

## 2020-12-17 DIAGNOSIS — Z72.51 UNPROTECTED SEX: ICD-10-CM

## 2020-12-17 DIAGNOSIS — Z30.011 ENCOUNTER FOR INITIAL PRESCRIPTION OF CONTRACEPTIVE PILLS: ICD-10-CM

## 2020-12-17 DIAGNOSIS — Z32.02 NEGATIVE PREGNANCY TEST: ICD-10-CM

## 2020-12-17 LAB
B-HCG UR QL: NEGATIVE
CTP QC/QA: YES

## 2020-12-17 PROCEDURE — 99999 PR PBB SHADOW E&M-EST. PATIENT-LVL III: ICD-10-PCS | Mod: PBBFAC,,, | Performed by: OBSTETRICS & GYNECOLOGY

## 2020-12-17 PROCEDURE — 0503F POSTPARTUM CARE VISIT: CPT | Mod: S$GLB,,, | Performed by: OBSTETRICS & GYNECOLOGY

## 2020-12-17 PROCEDURE — 99999 PR PBB SHADOW E&M-EST. PATIENT-LVL III: CPT | Mod: PBBFAC,,, | Performed by: OBSTETRICS & GYNECOLOGY

## 2020-12-17 PROCEDURE — 0503F PR POSTPARTUM CARE VISIT: ICD-10-PCS | Mod: S$GLB,,, | Performed by: OBSTETRICS & GYNECOLOGY

## 2020-12-17 RX ORDER — NORETHINDRONE AND ETHINYL ESTRADIOL AND FERROUS FUMARATE 0.8-25(24)
1 KIT ORAL DAILY
Qty: 30 TABLET | Refills: 11 | Status: CANCELLED | OUTPATIENT
Start: 2020-12-17 | End: 2021-12-17

## 2020-12-17 RX ORDER — NORETHINDRONE AND ETHINYL ESTRADIOL AND FERROUS FUMARATE 0.8-25(24)
1 KIT ORAL DAILY
Qty: 30 TABLET | Refills: 11 | Status: SHIPPED | OUTPATIENT
Start: 2020-12-17 | End: 2021-11-15 | Stop reason: SDUPTHER

## 2020-12-17 NOTE — PROGRESS NOTES
"CC: Post-partum follow-up    Cass Shipley is a 35 y.o. female  who presents for post-partum visit.  She is S/P a spontaneous vaginal delivery without complication.  She and the baby are doing well.  No pain.  No fever.   No bowel / bladder complaints.    Delivery Date:  10/29/2020  Delivery MD:  Shanika Armenta MD  Gender:  Girl  Breast Feeding: Previously, not for past week.  Depression: NO  Contraception: oral contraceptives (estrogen/progesterone)    Pregnancy was complicated by:  Advanced maternal age    /66   Ht 5' 3.5" (1.613 m)   Wt 59.1 kg (130 lb 4.7 oz)   Breastfeeding No   BMI 22.72 kg/m²     ROS:  GENERAL: No fever, chills, fatigability.  VULVAR: No pain, no lesions and no itching.  VAGINAL: No relaxation, no itching, no discharge, no abnormal bleeding and no lesions.  ABDOMEN: No abdominal pain. Denies nausea. Denies vomiting. No diarrhea. No constipation  BREAST: Denies pain. No lumps.  URINARY: No incontinence, no nocturia, no frequency and no dysuria.  CARDIOVASCULAR: No chest pain. No shortness of breath. No leg cramps.  NEUROLOGICAL: No headaches. No vision changes.    PHYSICAL EXAM:  Exam chaperoned by nurse  ABDOMEN:  Soft, non-tender, non-distended  VULVA:  Normal, no lesions  CERVIX:  Without lesions, polyps or tenderness.  Scant lochia alba.  UTERUS:  Normal size (involuted), shape, consistency, no mass or tenderness.  ADNEXA:  Normal in size without mass or tenderness    IMP:  Doing well S/P spontaneous vaginal delivery  Instructions / precautions reviewed   Contraceptive counseling - patient desires to restart OCP regimen from past.  Chewable OCP Loestrin FE generic.  E Rx done today.  Patient verbalized increased risk of coronary vascular and cerebrovascular events with smoking and OCP use.  Patient states that she is not smoking.    PLAN:  May resume normal activities    Follow up in about 1 year (around 2021) for Annual exam.     Brandyn Roche, IV, " MD

## 2021-01-04 ENCOUNTER — PATIENT MESSAGE (OUTPATIENT)
Dept: ADMINISTRATIVE | Facility: HOSPITAL | Age: 36
End: 2021-01-04

## 2021-01-25 PROBLEM — O36.5930 INTRAUTERINE GROWTH RESTRICTION (IUGR) AFFECTING CARE OF MOTHER, THIRD TRIMESTER, SINGLE GESTATION: Status: RESOLVED | Noted: 2020-10-22 | Resolved: 2021-01-25

## 2021-02-15 NOTE — TELEPHONE ENCOUNTER
Pt anxious for US result.  Advised to complete enrolling in [facility].  Email msg will be sent as soon as US results are rec'd.   5

## 2021-03-09 ENCOUNTER — TELEPHONE (OUTPATIENT)
Dept: OPTOMETRY | Facility: CLINIC | Age: 36
End: 2021-03-09

## 2021-03-10 ENCOUNTER — TELEPHONE (OUTPATIENT)
Dept: OPTOMETRY | Facility: CLINIC | Age: 36
End: 2021-03-10

## 2021-03-10 ENCOUNTER — OFFICE VISIT (OUTPATIENT)
Dept: OPTOMETRY | Facility: CLINIC | Age: 36
End: 2021-03-10
Payer: COMMERCIAL

## 2021-03-10 DIAGNOSIS — H52.203 MYOPIA WITH ASTIGMATISM, BILATERAL: ICD-10-CM

## 2021-03-10 DIAGNOSIS — H10.503 BLEPHAROCONJUNCTIVITIS OF BOTH EYES, UNSPECIFIED BLEPHAROCONJUNCTIVITIS TYPE: ICD-10-CM

## 2021-03-10 DIAGNOSIS — Z97.3 WEARS CONTACT LENSES: ICD-10-CM

## 2021-03-10 DIAGNOSIS — H52.13 MYOPIA WITH ASTIGMATISM, BILATERAL: ICD-10-CM

## 2021-03-10 DIAGNOSIS — H10.13 ALLERGIC CONJUNCTIVITIS OF BOTH EYES: ICD-10-CM

## 2021-03-10 DIAGNOSIS — H53.9 VISUAL DISTURBANCES: ICD-10-CM

## 2021-03-10 DIAGNOSIS — H43.393 VISUAL FLOATERS, BILATERAL: Primary | ICD-10-CM

## 2021-03-10 DIAGNOSIS — Z46.0 FITTING AND ADJUSTMENT OF SPECTACLES AND CONTACT LENSES: Primary | ICD-10-CM

## 2021-03-10 PROCEDURE — 99999 PR PBB SHADOW E&M-EST. PATIENT-LVL II: ICD-10-PCS | Mod: PBBFAC,,, | Performed by: OPTOMETRIST

## 2021-03-10 PROCEDURE — 92012 PR EYE EXAM, EST PATIENT,INTERMED: ICD-10-PCS | Mod: S$GLB,,, | Performed by: OPTOMETRIST

## 2021-03-10 PROCEDURE — 99999 PR PBB SHADOW E&M-EST. PATIENT-LVL I: ICD-10-PCS | Mod: PBBFAC,,, | Performed by: OPTOMETRIST

## 2021-03-10 PROCEDURE — 92015 PR REFRACTION: ICD-10-PCS | Mod: S$GLB,,, | Performed by: OPTOMETRIST

## 2021-03-10 PROCEDURE — 92015 DETERMINE REFRACTIVE STATE: CPT | Mod: S$GLB,,, | Performed by: OPTOMETRIST

## 2021-03-10 PROCEDURE — 99999 PR PBB SHADOW E&M-EST. PATIENT-LVL II: CPT | Mod: PBBFAC,,, | Performed by: OPTOMETRIST

## 2021-03-10 PROCEDURE — 92310 PR CONTACT LENS FITTING (NO CHANGE): ICD-10-PCS | Mod: CSM,,, | Performed by: OPTOMETRIST

## 2021-03-10 PROCEDURE — 92012 INTRM OPH EXAM EST PATIENT: CPT | Mod: S$GLB,,, | Performed by: OPTOMETRIST

## 2021-03-10 PROCEDURE — 1126F PR PAIN SEVERITY QUANTIFIED, NO PAIN PRESENT: ICD-10-PCS | Mod: S$GLB,,, | Performed by: OPTOMETRIST

## 2021-03-10 PROCEDURE — 1126F AMNT PAIN NOTED NONE PRSNT: CPT | Mod: S$GLB,,, | Performed by: OPTOMETRIST

## 2021-03-10 PROCEDURE — 99999 PR PBB SHADOW E&M-EST. PATIENT-LVL I: CPT | Mod: PBBFAC,,, | Performed by: OPTOMETRIST

## 2021-03-10 PROCEDURE — 92310 CONTACT LENS FITTING OU: CPT | Mod: CSM,,, | Performed by: OPTOMETRIST

## 2021-03-10 RX ORDER — NEOMYCIN SULFATE, POLYMYXIN B SULFATE AND DEXAMETHASONE 3.5; 10000; 1 MG/ML; [USP'U]/ML; MG/ML
1 SUSPENSION/ DROPS OPHTHALMIC 4 TIMES DAILY
Qty: 5 ML | Refills: 0 | Status: SHIPPED | OUTPATIENT
Start: 2021-03-10 | End: 2021-03-17

## 2021-03-16 ENCOUNTER — TELEPHONE (OUTPATIENT)
Dept: OPTOMETRY | Facility: CLINIC | Age: 36
End: 2021-03-16

## 2021-03-19 ENCOUNTER — TELEPHONE (OUTPATIENT)
Dept: OPTOMETRY | Facility: CLINIC | Age: 36
End: 2021-03-19

## 2021-04-05 ENCOUNTER — PATIENT MESSAGE (OUTPATIENT)
Dept: ADMINISTRATIVE | Facility: HOSPITAL | Age: 36
End: 2021-04-05

## 2021-07-06 ENCOUNTER — PATIENT MESSAGE (OUTPATIENT)
Dept: ADMINISTRATIVE | Facility: HOSPITAL | Age: 36
End: 2021-07-06

## 2021-10-05 ENCOUNTER — PATIENT MESSAGE (OUTPATIENT)
Dept: ADMINISTRATIVE | Facility: HOSPITAL | Age: 36
End: 2021-10-05

## 2021-11-15 ENCOUNTER — PATIENT MESSAGE (OUTPATIENT)
Dept: OBSTETRICS AND GYNECOLOGY | Facility: CLINIC | Age: 36
End: 2021-11-15
Payer: COMMERCIAL

## 2021-11-15 DIAGNOSIS — Z30.011 ENCOUNTER FOR INITIAL PRESCRIPTION OF CONTRACEPTIVE PILLS: ICD-10-CM

## 2021-11-15 RX ORDER — NORETHINDRONE AND ETHINYL ESTRADIOL AND FERROUS FUMARATE 0.8-25(24)
1 KIT ORAL DAILY
Qty: 90 TABLET | Refills: 0 | Status: SHIPPED | OUTPATIENT
Start: 2021-11-15 | End: 2022-01-24 | Stop reason: SDUPTHER

## 2022-01-18 ENCOUNTER — PATIENT MESSAGE (OUTPATIENT)
Dept: ADMINISTRATIVE | Facility: HOSPITAL | Age: 37
End: 2022-01-18
Payer: COMMERCIAL

## 2022-01-24 ENCOUNTER — PATIENT MESSAGE (OUTPATIENT)
Dept: OBSTETRICS AND GYNECOLOGY | Facility: CLINIC | Age: 37
End: 2022-01-24
Payer: COMMERCIAL

## 2022-01-24 DIAGNOSIS — Z30.011 ENCOUNTER FOR INITIAL PRESCRIPTION OF CONTRACEPTIVE PILLS: ICD-10-CM

## 2022-01-24 RX ORDER — NORETHINDRONE AND ETHINYL ESTRADIOL AND FERROUS FUMARATE 0.8-25(24)
1 KIT ORAL DAILY
Qty: 90 TABLET | Refills: 0 | Status: SHIPPED | OUTPATIENT
Start: 2022-01-24 | End: 2022-05-03 | Stop reason: SDUPTHER

## 2022-05-03 DIAGNOSIS — Z30.011 ENCOUNTER FOR INITIAL PRESCRIPTION OF CONTRACEPTIVE PILLS: ICD-10-CM

## 2022-05-03 RX ORDER — NORETHINDRONE AND ETHINYL ESTRADIOL AND FERROUS FUMARATE 0.8-25(24)
1 KIT ORAL DAILY
Qty: 90 TABLET | Refills: 0 | Status: SHIPPED | OUTPATIENT
Start: 2022-05-03 | End: 2022-05-19 | Stop reason: SDUPTHER

## 2022-05-03 NOTE — TELEPHONE ENCOUNTER
----- Message from Ning Méndez sent at 5/3/2022  2:36 PM CDT -----  Pt is calling to speak with nurse in regard to script  Pt needs a refill and leaves town tomorrow  noreth-ethinyl estradioL-iron 0.8mg-25mcg(24) and 75 mg (4) Chew    [id number]  [place], [place] - [address] AT [address]  Pt can be contacted at [phone]

## 2022-05-04 ENCOUNTER — PATIENT MESSAGE (OUTPATIENT)
Dept: OBSTETRICS AND GYNECOLOGY | Facility: CLINIC | Age: 37
End: 2022-05-04
Payer: COMMERCIAL

## 2022-05-04 DIAGNOSIS — Z30.011 ENCOUNTER FOR INITIAL PRESCRIPTION OF CONTRACEPTIVE PILLS: ICD-10-CM

## 2022-05-04 RX ORDER — NORETHINDRONE AND ETHINYL ESTRADIOL AND FERROUS FUMARATE 0.8-25(24)
1 KIT ORAL DAILY
Qty: 90 TABLET | Refills: 0 | Status: CANCELLED | OUTPATIENT
Start: 2022-05-04 | End: 2023-05-04

## 2022-05-19 ENCOUNTER — OFFICE VISIT (OUTPATIENT)
Dept: OBSTETRICS AND GYNECOLOGY | Facility: CLINIC | Age: 37
End: 2022-05-19
Payer: COMMERCIAL

## 2022-05-19 VITALS
DIASTOLIC BLOOD PRESSURE: 58 MMHG | HEIGHT: 63 IN | SYSTOLIC BLOOD PRESSURE: 100 MMHG | WEIGHT: 112 LBS | BODY MASS INDEX: 19.84 KG/M2

## 2022-05-19 DIAGNOSIS — Z80.3 FAMILY HISTORY OF BREAST CANCER IN FEMALE: ICD-10-CM

## 2022-05-19 DIAGNOSIS — Z63.79 STRESSFUL LIFE EVENTS AFFECTING FAMILY AND HOUSEHOLD: ICD-10-CM

## 2022-05-19 DIAGNOSIS — F41.1 ANXIETY, GENERALIZED: ICD-10-CM

## 2022-05-19 DIAGNOSIS — Z30.011 ENCOUNTER FOR INITIAL PRESCRIPTION OF CONTRACEPTIVE PILLS: ICD-10-CM

## 2022-05-19 DIAGNOSIS — Z01.419 ENCOUNTER FOR GYNECOLOGICAL EXAMINATION WITHOUT ABNORMAL FINDING: Primary | ICD-10-CM

## 2022-05-19 DIAGNOSIS — Z12.4 SCREENING FOR CERVICAL CANCER: ICD-10-CM

## 2022-05-19 PROBLEM — O09.523 MULTIGRAVIDA OF ADVANCED MATERNAL AGE IN THIRD TRIMESTER: Status: RESOLVED | Noted: 2020-06-11 | Resolved: 2022-05-19

## 2022-05-19 PROBLEM — N63.0 BREAST MASS IN FEMALE: Status: RESOLVED | Noted: 2017-07-19 | Resolved: 2022-05-19

## 2022-05-19 PROBLEM — N76.0 VAGINOSIS: Status: RESOLVED | Noted: 2017-07-19 | Resolved: 2022-05-19

## 2022-05-19 PROBLEM — Z67.91 RH NEGATIVE STATE IN ANTEPARTUM PERIOD: Status: RESOLVED | Noted: 2020-05-05 | Resolved: 2022-05-19

## 2022-05-19 PROBLEM — O26.899 RH NEGATIVE STATE IN ANTEPARTUM PERIOD: Status: RESOLVED | Noted: 2020-05-05 | Resolved: 2022-05-19

## 2022-05-19 PROBLEM — O22.43 HEMORRHOIDS DURING PREGNANCY IN THIRD TRIMESTER: Status: RESOLVED | Noted: 2020-06-11 | Resolved: 2022-05-19

## 2022-05-19 PROCEDURE — 99395 PREV VISIT EST AGE 18-39: CPT | Mod: S$GLB,,, | Performed by: OBSTETRICS & GYNECOLOGY

## 2022-05-19 PROCEDURE — 3078F DIAST BP <80 MM HG: CPT | Mod: CPTII,S$GLB,, | Performed by: OBSTETRICS & GYNECOLOGY

## 2022-05-19 PROCEDURE — 3008F PR BODY MASS INDEX (BMI) DOCUMENTED: ICD-10-PCS | Mod: CPTII,S$GLB,, | Performed by: OBSTETRICS & GYNECOLOGY

## 2022-05-19 PROCEDURE — 1159F PR MEDICATION LIST DOCUMENTED IN MEDICAL RECORD: ICD-10-PCS | Mod: CPTII,S$GLB,, | Performed by: OBSTETRICS & GYNECOLOGY

## 2022-05-19 PROCEDURE — 87624 HPV HI-RISK TYP POOLED RSLT: CPT | Performed by: OBSTETRICS & GYNECOLOGY

## 2022-05-19 PROCEDURE — 3074F SYST BP LT 130 MM HG: CPT | Mod: CPTII,S$GLB,, | Performed by: OBSTETRICS & GYNECOLOGY

## 2022-05-19 PROCEDURE — 99999 PR PBB SHADOW E&M-EST. PATIENT-LVL III: CPT | Mod: PBBFAC,,, | Performed by: OBSTETRICS & GYNECOLOGY

## 2022-05-19 PROCEDURE — 3008F BODY MASS INDEX DOCD: CPT | Mod: CPTII,S$GLB,, | Performed by: OBSTETRICS & GYNECOLOGY

## 2022-05-19 PROCEDURE — 3074F PR MOST RECENT SYSTOLIC BLOOD PRESSURE < 130 MM HG: ICD-10-PCS | Mod: CPTII,S$GLB,, | Performed by: OBSTETRICS & GYNECOLOGY

## 2022-05-19 PROCEDURE — 3078F PR MOST RECENT DIASTOLIC BLOOD PRESSURE < 80 MM HG: ICD-10-PCS | Mod: CPTII,S$GLB,, | Performed by: OBSTETRICS & GYNECOLOGY

## 2022-05-19 PROCEDURE — 1159F MED LIST DOCD IN RCRD: CPT | Mod: CPTII,S$GLB,, | Performed by: OBSTETRICS & GYNECOLOGY

## 2022-05-19 PROCEDURE — 88175 CYTOPATH C/V AUTO FLUID REDO: CPT | Performed by: OBSTETRICS & GYNECOLOGY

## 2022-05-19 PROCEDURE — 99395 PR PREVENTIVE VISIT,EST,18-39: ICD-10-PCS | Mod: S$GLB,,, | Performed by: OBSTETRICS & GYNECOLOGY

## 2022-05-19 PROCEDURE — 99999 PR PBB SHADOW E&M-EST. PATIENT-LVL III: ICD-10-PCS | Mod: PBBFAC,,, | Performed by: OBSTETRICS & GYNECOLOGY

## 2022-05-19 RX ORDER — DEXTROAMPHETAMINE SACCHARATE, AMPHETAMINE ASPARTATE, DEXTROAMPHETAMINE SULFATE AND AMPHETAMINE SULFATE 5; 5; 5; 5 MG/1; MG/1; MG/1; MG/1
1 TABLET ORAL 3 TIMES DAILY
COMMUNITY
Start: 2022-05-01

## 2022-05-19 RX ORDER — NORETHINDRONE AND ETHINYL ESTRADIOL AND FERROUS FUMARATE 0.8-25(24)
1 KIT ORAL DAILY
Qty: 90 TABLET | Refills: 3 | Status: SHIPPED | OUTPATIENT
Start: 2022-05-19 | End: 2023-06-13

## 2022-05-19 RX ORDER — ESCITALOPRAM OXALATE 10 MG/1
10 TABLET ORAL DAILY
Qty: 30 TABLET | Refills: 2 | Status: SHIPPED | OUTPATIENT
Start: 2022-05-19 | End: 2023-05-19

## 2022-05-19 NOTE — PROGRESS NOTES
"Well woman exam    Cass Shipley is a 36 y.o.   patient who presents for a well woman exam.  LMP: No LMP recorded. (Menstrual status: Birth Control).  Patient reports no menses on current OCP regimen.  Good compliance with OCPs reported.  No gynecologic issues, problems, or complaints.      Patient does report generalized anxiety/life stress.  Patient denies depression.    Past Medical History:   Diagnosis Date    Abnormal Pap smear     Allergy     Anxiety     GERD (gastroesophageal reflux disease)     Hemorrhoids without complication     Supraventricular tachycardia      Past Surgical History:   Procedure Laterality Date    CERVICAL BIOPSY  W/ LOOP ELECTRODE EXCISION      VAGINAL DELIVERY       Social History     Socioeconomic History    Marital status:    Occupational History     Employer: [employer]   Tobacco Use    Smoking status: Former Smoker     Packs/day: 0.50     Years: 6.00     Pack years: 3.00    Smokeless tobacco: Never Used   Substance and Sexual Activity    Alcohol use: Yes     Alcohol/week: 2.0 standard drinks     Types: 2 Glasses of wine per week    Drug use: No    Sexual activity: Yes     Partners: Male     Birth control/protection: OCP     Comment: ocps     Family History   Problem Relation Age of Onset    Arrhythmia Mother     Breast cancer Paternal Grandmother     Breast cancer Paternal Aunt     Breast cancer Paternal Aunt     Cancer Neg Hx     Colon cancer Neg Hx     Diabetes Neg Hx     Eclampsia Neg Hx     Hypertension Neg Hx     Miscarriages / Stillbirths Neg Hx     Ovarian cancer Neg Hx      labor Neg Hx     Stroke Neg Hx      OB History        3    Para   2    Term   2            AB   1    Living   2       SAB   1    IAB        Ectopic        Multiple   0    Live Births   2                 BP (!) 100/58   Ht 5' 3" (1.6 m)   Wt 50.8 kg (111 lb 15.9 oz)   Breastfeeding No   BMI 19.84 kg/m² "       ROS:  GENERAL: Denies weight gain or weight loss. Feeling well overall.   SKIN: Denies rash or lesions.   HEAD: Denies head injury or headache.   NODES: Denies enlarged lymph nodes.   CHEST: Denies chest pain or shortness of breath.   CARDIOVASCULAR: Denies palpitations or left sided chest pain.   ABDOMEN: No abdominal pain, constipation, diarrhea, nausea, vomiting or rectal bleeding.   URINARY: No frequency, dysuria, hematuria, or burning on urination.  REPRODUCTIVE: See HPI.   BREASTS: The patient performs breast self-examination and denies pain, lumps, or nipple discharge.   HEMATOLOGIC: No easy bruisability or excessive bleeding.   MUSCULOSKELETAL: Denies joint pain or swelling.   NEUROLOGIC: Denies syncope or weakness.   PSYCHIATRIC: Denies depression, anxiety or mood swings.    PHYSICAL EXAM:  APPEARANCE: Well nourished, well developed, in no acute distress.  AFFECT: WNL, alert and oriented x 3  SKIN: No acne or hirsutism  NECK: Neck symmetric without masses or thyromegaly  NODES: No inguinal, cervical, axillary, or femoral lymph node enlargement  CHEST: Good respiratory effect  ABDOMEN: Soft.  No tenderness or masses.  No hepatosplenomegaly.  No hernias.  BREASTS: Symmetrical, no skin changes or visible lesions.  No palpable masses, nipple discharge bilaterally.  Bilateral implants/augmentation scars noted.  PELVIC: Normal external genitalia without lesions.  Normal hair distribution.  Adequate perineal body, normal urethral meatus.  Vagina moist and well rugated without lesions or discharge.  Cervix pink, without lesions, discharge or tenderness.  No significant cystocele or rectocele.  Bimanual exam shows uterus to be normal size, regular, mobile and nontender.  Adnexa without masses or tenderness.    EXTREMITIES: No edema.    Encounter for gynecological examination without abnormal finding    Anxiety, generalized  -     EScitalopram oxalate (LEXAPRO) 10 MG tablet; Take 1 tablet (10 mg total) by mouth  once daily.  Dispense: 30 tablet; Refill: 2    Stressful life events affecting family and household  -     EScitalopram oxalate (LEXAPRO) 10 MG tablet; Take 1 tablet (10 mg total) by mouth once daily.  Dispense: 30 tablet; Refill: 2    Family history of breast cancer in female  -     Mammo Digital Screening Bilat w/ [facility]; Future; Expected date: 05/19/2022    Screening for cervical cancer  -     Liquid-Based Pap Smear, Screening  -     HPV High Risk Genotypes, PCR    Encounter for initial prescription of contraceptive pills  -     noreth-ethinyl estradioL-iron 0.8mg-25mcg(24) and 75 mg (4) Chew; Take 1 capsule by mouth once daily.  Dispense: 90 tablet; Refill: 3      Age specific counseling.    Risk and benefits to continued OCP use reviewed.  ERx placed by me today.    Will initiate low-dose Lexapro treatment for generalized anxiety/life stressors.  If no relief noted, I am recommending PCP or psychiatry/psychology follow-up.  Patient verbalized understanding of this recommendation.    Thin prep Pap smear with high-risk HPV Co test done today.    Patient desired screening mammogram due to strong family history of breast cancer (2 aunts and her grandmother).  Order placed by me today.    Patient was counseled today on A.C.S. Pap guidelines and recommendations for yearly pelvic exams, mammograms and monthly self breast exams; to see her PCP for other health maintenance.     Follow up in about 1 year (around 5/19/2023) for Annual exam.     Brandyn Roche IV, MD

## 2023-04-28 ENCOUNTER — HOSPITAL ENCOUNTER (OUTPATIENT)
Dept: RADIOLOGY | Facility: HOSPITAL | Age: 38
Discharge: HOME | End: 2023-04-28
Attending: OBSTETRICS & GYNECOLOGY
Payer: COMMERCIAL

## 2023-04-28 VITALS — WEIGHT: 117 LBS | BODY MASS INDEX: 20.73 KG/M2

## 2023-04-28 DIAGNOSIS — Z80.3 FAMILY HISTORY OF BREAST CANCER IN FEMALE: ICD-10-CM

## 2023-04-28 PROCEDURE — 77067 MAMMO DIGITAL SCREENING BILAT WITH TOMO: ICD-10-PCS | Mod: 26,,, | Performed by: RADIOLOGY

## 2023-04-28 PROCEDURE — 77067 SCR MAMMO BI INCL CAD: CPT | Mod: 26,,, | Performed by: RADIOLOGY

## 2023-04-28 PROCEDURE — 77063 MAMMO DIGITAL SCREENING BILAT WITH TOMO: ICD-10-PCS | Mod: 26,,, | Performed by: RADIOLOGY

## 2023-04-28 PROCEDURE — 77067 SCR MAMMO BI INCL CAD: CPT | Mod: TC

## 2023-04-28 PROCEDURE — 77063 BREAST TOMOSYNTHESIS BI: CPT | Mod: 26,,, | Performed by: RADIOLOGY

## 2023-06-10 DIAGNOSIS — Z30.011 ENCOUNTER FOR INITIAL PRESCRIPTION OF CONTRACEPTIVE PILLS: ICD-10-CM

## 2023-06-13 RX ORDER — NORETHINDRONE, ETHINYL ESTRADIOL, AND FERROUS FUMARATE 0.8-25(24)
KIT ORAL
Qty: 84 TABLET | Refills: 2 | Status: SHIPPED | OUTPATIENT
Start: 2023-06-13 | End: 2024-02-23

## 2023-12-21 ENCOUNTER — TELEPHONE (OUTPATIENT)
Dept: OPTOMETRY | Facility: CLINIC | Age: 38
End: 2023-12-21
Payer: COMMERCIAL

## 2023-12-21 NOTE — TELEPHONE ENCOUNTER
----- Message from Serena Ferreira sent at 12/21/2023  3:52 PM CST -----  Regarding: Consult/Advisory  Contact: Cass Shipley  Consult/Advisory    Name Of Caller: Cass Shipley       Contact Preference: [phone] (home)      Nature of call: Patient calling to speak to staff regarding extended her Rx for spectacles instead of coming in as patient says there are no changes. Perfect vision with the last Rx. Requesting a call back.

## 2023-12-21 NOTE — TELEPHONE ENCOUNTER
----- Message from Serena Ferreira sent at 12/21/2023  3:52 PM CST -----  Regarding: Consult/Advisory  Contact: Cass Shipley  Consult/Advisory    Name Of Caller: Cass Shipley       Contact Preference: [phone] (home)      Nature of call: Patient calling to speak to staff regarding extended her Rx for spectacles instead of coming in as patient says there are no changes. Perfect vision with the last Rx. Requesting a call back.

## 2024-01-02 ENCOUNTER — OFFICE VISIT (OUTPATIENT)
Dept: OPTOMETRY | Facility: CLINIC | Age: 39
End: 2024-01-02
Payer: COMMERCIAL

## 2024-01-02 DIAGNOSIS — Z46.0 FITTING AND ADJUSTMENT OF SPECTACLES AND CONTACT LENSES: Primary | ICD-10-CM

## 2024-01-02 DIAGNOSIS — H52.203 MYOPIA WITH ASTIGMATISM, BILATERAL: ICD-10-CM

## 2024-01-02 DIAGNOSIS — H52.13 MYOPIA WITH ASTIGMATISM, BILATERAL: ICD-10-CM

## 2024-01-02 DIAGNOSIS — H04.123 DRY EYE SYNDROME OF BOTH EYES: Primary | ICD-10-CM

## 2024-01-02 PROCEDURE — 99999 PR PBB SHADOW E&M-EST. PATIENT-LVL II: CPT | Mod: PBBFAC,,, | Performed by: OPTOMETRIST

## 2024-01-02 PROCEDURE — 92014 COMPRE OPH EXAM EST PT 1/>: CPT | Mod: S$GLB,,, | Performed by: OPTOMETRIST

## 2024-01-02 PROCEDURE — 92310 CONTACT LENS FITTING OU: CPT | Mod: CSM,S$GLB,, | Performed by: OPTOMETRIST

## 2024-01-02 PROCEDURE — 1159F MED LIST DOCD IN RCRD: CPT | Mod: CPTII,,, | Performed by: OPTOMETRIST

## 2024-01-02 PROCEDURE — 92015 DETERMINE REFRACTIVE STATE: CPT | Mod: S$GLB,,, | Performed by: OPTOMETRIST

## 2024-01-02 PROCEDURE — 1160F RVW MEDS BY RX/DR IN RCRD: CPT | Mod: CPTII,,, | Performed by: OPTOMETRIST

## 2024-01-02 RX ORDER — PROPRANOLOL HYDROCHLORIDE 10 MG/1
10 TABLET ORAL
COMMUNITY
Start: 2023-10-31

## 2024-01-02 NOTE — PROGRESS NOTES
OSIEL CHIANG: 3/10/2021   Chief complaint (CC): Pt states that she hasn't noticed any changes with   her rx and states that she loves her most current cl rx.  Glasses? yes  Contacts? yes  H/o eye surgery, injections or laser: no  H/o eye injury: no  Known eye conditions? no  Family h/o eye conditions? Glaucoma - mother  Eye gtts? AT's PRN      (-) Flashes (-)  Floaters (-) Mucous   (-)  Tearing (-) Itching (-) Burning   (-) Headaches (-) Eye Pain/discomfort (-) Irritation   (-)  Redness (-) Double vision (-) Blurry vision    Diabetic? no  A1c?       Last edited by Claire Tinoco MA on 1/2/2024 11:04 AM.            Assessment /Plan     For exam results, see Encounter Report.      Dry eye syndrome of both eyes  Cont ATs prn.     Myopia with astigmatism, bilateral  SRx released to patient. Patient educated on lens options. Normal ocular health. RTC 1 year for routine exam.   ClRx trials ordered. Pt needs CLFU and DFE at dispense.     Pt here w/3 y/o daughter Viviane.

## 2024-01-09 ENCOUNTER — PATIENT MESSAGE (OUTPATIENT)
Dept: OPTOMETRY | Facility: CLINIC | Age: 39
End: 2024-01-09
Payer: COMMERCIAL

## 2024-01-10 ENCOUNTER — TELEPHONE (OUTPATIENT)
Dept: OPTOMETRY | Facility: CLINIC | Age: 39
End: 2024-01-10
Payer: COMMERCIAL

## 2024-01-11 ENCOUNTER — TELEPHONE (OUTPATIENT)
Dept: OPTOMETRY | Facility: CLINIC | Age: 39
End: 2024-01-11
Payer: COMMERCIAL

## 2024-01-11 NOTE — TELEPHONE ENCOUNTER
----- Message from Maryse Ferreira sent at 1/11/2024  1:22 PM CST -----  Consult/Advisory    Name Of Caller:Cass       Contact Preference:[phone] she asked if you can call     Nature of call: Ptn called to see if she can get her rx faxed to [facility] [phone]

## 2024-02-06 DIAGNOSIS — Z30.011 ENCOUNTER FOR INITIAL PRESCRIPTION OF CONTRACEPTIVE PILLS: ICD-10-CM

## 2024-02-06 NOTE — TELEPHONE ENCOUNTER
Refill Routing Note   Medication(s) are not appropriate for processing by [facility] for the following reason(s):        Patient not seen by provider within 15 months  Required vitals outdated    [facility] action(s):  Defer               Appointments  past 12m or future 3m with PCP    Date Provider   Last Visit   Visit date not found Brandyn Roche III, MD   Next Visit   Visit date not found Brandyn Roche III, MD   ED visits in past 90 days: 0        Note composed:2:26 PM 02/06/2024

## 2024-02-07 RX ORDER — NORETHINDRONE, ETHINYL ESTRADIOL, AND FERROUS FUMARATE 0.8-25(24)
KIT ORAL
Qty: 84 TABLET | Refills: 0 | OUTPATIENT
Start: 2024-02-07

## 2024-02-07 NOTE — TELEPHONE ENCOUNTER
Provider Staff:  Please note Refusal of medication.     Action required for this patient.      Requested Prescriptions     Refused Prescriptions Disp Refills    LAYOLIS FE 0.8mg-25mcg(24) and 75 mg (4) Chew [Pharmacy Med Name: Layolis Fe 0.8 mg-25 mcg (24)/75 mg (4) chewable tablet] 84 tablet 0     Sig: CHEW AND SWALLOW 1 TABLET EVERY DAY     Refused By: BENNETT SOSA (ISAEL)     Reason for Refusal: Patient never under prescriber care      Thanks!  [facility]   Note composed: 02/07/2024 9:40 AM         No FOVS found with another provider to establish care.

## 2024-02-14 ENCOUNTER — TELEPHONE (OUTPATIENT)
Dept: OPTOMETRY | Facility: CLINIC | Age: 39
End: 2024-02-14
Payer: COMMERCIAL

## 2024-02-14 NOTE — TELEPHONE ENCOUNTER
----- Message from Aimee Melendrez sent at 2/14/2024 12:41 PM CST -----  Regarding: RX Fax Request  Patient called in regards to getting rx for eye glasses faxed to [facility]- [phone]    Please call back to further assist- [phone] Pt asked for call to confirm rx was faxed.

## 2024-02-21 DIAGNOSIS — Z30.011 ENCOUNTER FOR INITIAL PRESCRIPTION OF CONTRACEPTIVE PILLS: ICD-10-CM

## 2024-02-21 RX ORDER — NORETHINDRONE AND ETHINYL ESTRADIOL AND FERROUS FUMARATE 0.8-25(24)
1 KIT ORAL DAILY
Qty: 84 TABLET | Refills: 0 | OUTPATIENT
Start: 2024-02-21

## 2024-02-21 NOTE — TELEPHONE ENCOUNTER
Refill Routing Note   Medication(s) are not appropriate for processing by [facility] for the following reason(s):        Patient not seen by provider within 15 months:     [facility] action(s):  Defer   Requires appointment : Yes      Medication Therapy Plan:         Appointments  past 12m or future 3m with PCP    Date Provider   Last Visit   5/19/2022 Brandyn Roche IV, MD   Next Visit   Visit date not found Brandyn Roche IV, MD   ED visits in past 90 days: 0        Note composed:1:02 PM 02/21/2024

## 2024-02-23 DIAGNOSIS — Z30.011 ENCOUNTER FOR INITIAL PRESCRIPTION OF CONTRACEPTIVE PILLS: ICD-10-CM

## 2024-02-23 RX ORDER — NORETHINDRONE AND ETHINYL ESTRADIOL AND FERROUS FUMARATE 0.8-25(24)
KIT ORAL
Qty: 84 TABLET | Refills: 2 | Status: CANCELLED | OUTPATIENT
Start: 2024-02-23

## 2024-02-23 RX ORDER — NORETHINDRONE AND ETHINYL ESTRADIOL AND FERROUS FUMARATE 0.8-25(24)
1 KIT ORAL DAILY
Qty: 28 TABLET | Refills: 0 | Status: SHIPPED | OUTPATIENT
Start: 2024-02-23 | End: 2024-02-26 | Stop reason: SDUPTHER

## 2024-02-23 NOTE — TELEPHONE ENCOUNTER
----- Message from Nuria Calabrese sent at 2/23/2024  3:44 PM CST -----  Patient called in about her birth control refill. Patient states that she has to start her new pack in two days. Patient states the pharmacy sent over a request for a refill. Patient would like a call back to discuss. Pharmacy is updated in chart.

## 2024-02-23 NOTE — TELEPHONE ENCOUNTER
----- Message from Ana Maria Mays sent at 2/23/2024  4:01 PM CST -----  Name of Who is calling :  DEON HIDALGO [[id number]]      What is the request in detail:called in regards for birth control .Please assist         Can the clinic reply by [facility]:no            [phone]

## 2024-02-23 NOTE — TELEPHONE ENCOUNTER
Refill Routing Note   Medication(s) are not appropriate for processing by [facility] for the following reason(s):      Patient not seen by provider within 15 months  Required labs outdated    [facility] action(s):  Defer Care Due:  None identified            Appointments  past 12m or future 3m with PCP    Date Provider   Last Visit   Visit date not found Brandyn Roche III, MD   Next Visit   Visit date not found Brandyn Roche III, MD   ED visits in past 90 days: 0        Note composed:9:30 AM 02/23/2024

## 2024-02-24 DIAGNOSIS — Z30.011 ENCOUNTER FOR INITIAL PRESCRIPTION OF CONTRACEPTIVE PILLS: ICD-10-CM

## 2024-02-24 RX ORDER — NORETHINDRONE, ETHINYL ESTRADIOL, AND FERROUS FUMARATE 0.8-25(24)
KIT ORAL
Qty: 84 TABLET | Refills: 2 | OUTPATIENT
Start: 2024-02-24

## 2024-02-24 NOTE — TELEPHONE ENCOUNTER
Refill Decision Note   Cass Shipley  is requesting a refill authorization.  Brief Assessment and Rationale for Refill:  Quick Discontinue     Medication Therapy Plan:       Medication Reconciliation Completed: No   Comments:     No Care Gaps recommended.     Note composed:9:13 AM 02/24/2024

## 2024-02-26 ENCOUNTER — OFFICE VISIT (OUTPATIENT)
Dept: OBSTETRICS AND GYNECOLOGY | Facility: CLINIC | Age: 39
End: 2024-02-26
Payer: COMMERCIAL

## 2024-02-26 VITALS
HEIGHT: 63 IN | BODY MASS INDEX: 21.13 KG/M2 | SYSTOLIC BLOOD PRESSURE: 110 MMHG | DIASTOLIC BLOOD PRESSURE: 72 MMHG | WEIGHT: 119.25 LBS

## 2024-02-26 DIAGNOSIS — Z30.9 ENCOUNTER FOR CONTRACEPTIVE MANAGEMENT, UNSPECIFIED TYPE: ICD-10-CM

## 2024-02-26 DIAGNOSIS — Z12.4 PAP SMEAR FOR CERVICAL CANCER SCREENING: ICD-10-CM

## 2024-02-26 DIAGNOSIS — K64.9 HEMORRHOIDS, UNSPECIFIED HEMORRHOID TYPE: ICD-10-CM

## 2024-02-26 DIAGNOSIS — Z01.419 ENCOUNTER FOR WELL WOMAN EXAM: Primary | ICD-10-CM

## 2024-02-26 DIAGNOSIS — Z12.31 BREAST CANCER SCREENING BY MAMMOGRAM: ICD-10-CM

## 2024-02-26 DIAGNOSIS — Z98.890 HISTORY OF LOOP ELECTRICAL EXCISION PROCEDURE (LEEP): ICD-10-CM

## 2024-02-26 LAB
B-HCG UR QL: NEGATIVE
CTP QC/QA: YES

## 2024-02-26 PROCEDURE — 3078F DIAST BP <80 MM HG: CPT | Mod: CPTII,S$GLB,, | Performed by: FAMILY MEDICINE

## 2024-02-26 PROCEDURE — 1159F MED LIST DOCD IN RCRD: CPT | Mod: CPTII,S$GLB,, | Performed by: FAMILY MEDICINE

## 2024-02-26 PROCEDURE — 1160F RVW MEDS BY RX/DR IN RCRD: CPT | Mod: CPTII,S$GLB,, | Performed by: FAMILY MEDICINE

## 2024-02-26 PROCEDURE — 3008F BODY MASS INDEX DOCD: CPT | Mod: CPTII,S$GLB,, | Performed by: FAMILY MEDICINE

## 2024-02-26 PROCEDURE — 81025 URINE PREGNANCY TEST: CPT | Mod: S$GLB,,, | Performed by: FAMILY MEDICINE

## 2024-02-26 PROCEDURE — 99395 PREV VISIT EST AGE 18-39: CPT | Mod: S$GLB,,, | Performed by: FAMILY MEDICINE

## 2024-02-26 PROCEDURE — 87624 HPV HI-RISK TYP POOLED RSLT: CPT | Performed by: FAMILY MEDICINE

## 2024-02-26 PROCEDURE — 88175 CYTOPATH C/V AUTO FLUID REDO: CPT | Performed by: FAMILY MEDICINE

## 2024-02-26 PROCEDURE — 99999 PR PBB SHADOW E&M-EST. PATIENT-LVL III: CPT | Mod: PBBFAC,,, | Performed by: FAMILY MEDICINE

## 2024-02-26 PROCEDURE — 3074F SYST BP LT 130 MM HG: CPT | Mod: CPTII,S$GLB,, | Performed by: FAMILY MEDICINE

## 2024-02-26 RX ORDER — HYDROCORTISONE ACETATE PRAMOXINE HCL 2.5; 1 G/100G; G/100G
CREAM TOPICAL 2 TIMES DAILY
Qty: 30 G | Refills: 0 | Status: SHIPPED | OUTPATIENT
Start: 2024-02-26 | End: 2024-03-11

## 2024-02-26 RX ORDER — DEXTROAMPHETAMINE SACCHARATE, AMPHETAMINE ASPARTATE, DEXTROAMPHETAMINE SULFATE AND AMPHETAMINE SULFATE 7.5; 7.5; 7.5; 7.5 MG/1; MG/1; MG/1; MG/1
1 TABLET ORAL 2 TIMES DAILY
COMMUNITY
Start: 2024-02-21

## 2024-02-26 RX ORDER — NORETHINDRONE AND ETHINYL ESTRADIOL AND FERROUS FUMARATE 0.8-25(24)
1 KIT ORAL DAILY
Qty: 84 TABLET | Refills: 3 | Status: SHIPPED | OUTPATIENT
Start: 2024-02-26

## 2024-02-26 NOTE — PROGRESS NOTES
HISTORY OF PRESENT ILLNESS:    Cass Shipley is a 38 y.o. female, , No LMP recorded (lmp unknown). (Menstrual status: Birth Control).,  presents for a routine annual exam .   Last pap:   NL   Last mammogram: na due this yr  Last colon ca screening:  na   SA: male partner, does not use condoms  Contraception: oral progesterone-only contraceptive.    Sexually transmitted infection risk: very low risk of STD exposure.   Menstrual flow: no cycle with ocp  -noticing feeling flushed intermittently and night sweats, saw pcp and w/u normal per pt. No breast pain, uti sx,abnormal vd.  This is the extent of the patient's complaints at this time.     Past Medical History:   Diagnosis Date    Abnormal Pap smear     Allergy     Anxiety     GERD (gastroesophageal reflux disease)     Hemorrhoids without complication     Supraventricular tachycardia        Past Surgical History:   Procedure Laterality Date    AUGMENTATION OF BREAST  2007    CERVICAL BIOPSY  W/ LOOP ELECTRODE EXCISION      VAGINAL DELIVERY         MEDICATIONS AND ALLERGIES:      Current Outpatient Medications:     dextroamphetamine-amphetamine 30 mg Tab, Take 1 tablet by mouth 2 (two) times daily., Disp: , Rfl:     propranoloL (INDERAL) 10 MG tablet, Take 10 mg by mouth as needed., Disp: , Rfl:     dextroamphetamine-amphetamine (ADDERALL) 20 mg tablet, Take 1 tablet by mouth 3 (three) times daily., Disp: , Rfl:     EScitalopram oxalate (LEXAPRO) 10 MG tablet, Take 1 tablet (10 mg total) by mouth once daily., Disp: 30 tablet, Rfl: 2    hydrocortisone-pramoxine (ANALPRAM-HC) 2.5-1 % Crea, Place rectally 2 (two) times daily. for 14 days, Disp: 30 g, Rfl: 0    noreth-ethinyl estradioL-iron (LAYOLIS FE) 0.8mg-25mcg(24) and 75 mg (4) Chew, Take 1 tablet by mouth once daily. Last refill before annual. Please keep appt for further refills, Disp: 84 tablet, Rfl: 3    Review of patient's allergies indicates:   Allergen Reactions    Vicodin  [hydrocodone-acetaminophen]      ITCHING  Okay to take percocet and acetaminophen       Family History   Problem Relation Age of Onset    Breast cancer Paternal Grandmother     Skin cancer Father     Arrhythmia Mother     Breast cancer Paternal Aunt     Breast cancer Paternal Aunt     Cancer Neg Hx     Colon cancer Neg Hx     Diabetes Neg Hx     Eclampsia Neg Hx     Hypertension Neg Hx     Miscarriages / Stillbirths Neg Hx     Ovarian cancer Neg Hx      labor Neg Hx     Stroke Neg Hx        Social History     Socioeconomic History    Marital status:    Occupational History     Employer: [employer]   Tobacco Use    Smoking status: Former     Current packs/day: 0.50     Average packs/day: 0.5 packs/day for 6.0 years (3.0 ttl pk-yrs)     Types: Cigarettes    Smokeless tobacco: Never   Substance and Sexual Activity    Alcohol use: Yes     Alcohol/week: 2.0 standard drinks of alcohol     Types: 2 Glasses of wine per week    Drug use: No    Sexual activity: Yes     Partners: Male     Birth control/protection: OCP     Comment: ocps       OB History    Para Term  AB Living   3 2 2   1 2   SAB IAB Ectopic Multiple Live Births   1     0 2      # Outcome Date GA Lbr Timothy/2nd Weight Sex Delivery Anes PTL Lv   3 Term 10/29/20 37w0d  1.931 kg (4 lb 4.1 oz) F Vag-Spont EPI N QUE   2 SAB 20        FD   1 Term 08    M Vag-Spont   QUE          COMPREHENSIVE GYN HISTORY:  PAP History: + abnormal Paps.  Infection History: Denies STDs. Denies PID.  Benign History: Denies uterine fibroids. Denies ovarian cysts. Denies endometriosis. Denies other conditions.  Cancer History: Denies cervical cancer. Denies uterine cancer or hyperplasia. Denies ovarian cancer. Denies vulvar cancer or pre-cancer. Denies vaginal cancer or pre-cancer. Denies breast cancer. Denies colon cancer.      ROS:  GENERAL: No weight changes. No swelling. No fatigue. No fever.  BREASTS: No pain. No lumps. No  "discharge.  ABDOMEN: No pain. No nausea. No vomiting. No diarrhea. No constipation.  REPRODUCTIVE: No abnormal bleeding. No pelvic pain.   VULVA: No pain. No lesions. No itching.  VAGINA: No relaxation. No itching. No odor. No discharge. No lesions.  URINARY: No incontinence. No nocturia. No frequency. No dysuria.    /72   Ht 5' 3" (1.6 m)   Wt 54.1 kg (119 lb 4.3 oz)   LMP  (LMP Unknown)   BMI 21.13 kg/m²     PE:  Physical Exam:   Constitutional: She is oriented to person, place, and time. She appears well-developed and well-nourished. No distress.      Neck: No thyroid mass and no thyromegaly present.     Pulmonary/Chest: Right breast exhibits no inverted nipple, no mass, no nipple discharge, no skin change, no tenderness and no bleeding. Left breast exhibits no inverted nipple, no mass, no nipple discharge, no skin change, no tenderness and no bleeding.        Abdominal: Soft. There is no abdominal tenderness.     Genitourinary:    Vagina, uterus, right adnexa and left adnexa normal.   Rectum:      External hemorrhoid present.      Pelvic exam was performed with patient in the lithotomy position.   The external female genitalia was normal.   Genitalia hair distrobution normal .   There is no rash or lesion on the right labia. There is no rash or lesion on the left labia. Cervix is normal. Right adnexum displays no mass, no tenderness and no fullness. Left adnexum displays no mass, no tenderness and no fullness. No vaginal discharge, tenderness, bleeding, rectocele, cystocele or prolapse of vaginal walls in the vagina.    No foreign body in the vagina.   Cervix exhibits no motion tenderness, no lesion, no discharge, no friability, no tenderness and no polyp.    pap smear completedUterus is not enlarged and not tender. Normal urethral meatus.              Neurological: She is alert and oriented to person, place, and time.          PROCEDURES/ORDERS:  Pap    Results for orders placed or performed in visit " on 02/26/24   POCT Urine Pregnancy   Result Value Ref Range    POC Preg Test, Ur Negative Negative     Acceptable Yes        Assessment/Plan:    Encounter for well woman exam    Encounter for contraceptive management, unspecified type  -     POCT Urine Pregnancy  -     noreth-ethinyl estradioL-iron (LAYOLIS FE) 0.8mg-25mcg(24) and 75 mg (4) Chew; Take 1 tablet by mouth once daily. Last refill before annual. Please keep appt for further refills  Dispense: 84 tablet; Refill: 3    Pap smear for cervical cancer screening  -     HPV High Risk Genotypes, PCR  -     Liquid-Based Pap Smear, Screening    History of loop electrical excision procedure (LEEP)  -     HPV High Risk Genotypes, PCR  -     Liquid-Based Pap Smear, Screening    Hemorrhoids, unspecified hemorrhoid type  -     hydrocortisone-pramoxine (ANALPRAM-HC) 2.5-1 % Crea; Place rectally 2 (two) times daily. for 14 days  Dispense: 30 g; Refill: 0    Breast cancer screening by mammogram  -     Mammo Digital Screening Bilat w/ [facility]; Future; Expected date: 02/26/2024        COUNSELING:  The patient was counseled today on:  -A.C.S. Pap and pelvic exam guidelines, recomendations for yearly mammogram, monthly self breast exams and to follow up with her PCP for other health maintenance.    FOLLOW-UP  annually for [facility].

## 2024-03-01 LAB
FINAL PATHOLOGIC DIAGNOSIS: NORMAL
Lab: NORMAL

## 2024-03-08 LAB
HPV HR 12 DNA SPEC QL NAA+PROBE: NEGATIVE
HPV16 AG SPEC QL: NEGATIVE
HPV18 DNA SPEC QL NAA+PROBE: NEGATIVE

## 2024-04-16 ENCOUNTER — TELEPHONE (OUTPATIENT)
Dept: OPTOMETRY | Facility: CLINIC | Age: 39
End: 2024-04-16
Payer: COMMERCIAL

## 2024-04-16 NOTE — TELEPHONE ENCOUNTER
----- Message from Maryjane Sandy sent at 4/16/2024  2:50 PM CDT -----  Contact: pt @ [phone]  Cass Shipley calling regarding Patient Advice (message) for #pt is calling to speak with nurse concerning contact lens, asking for call back

## 2024-04-24 ENCOUNTER — OFFICE VISIT (OUTPATIENT)
Dept: OPTOMETRY | Facility: CLINIC | Age: 39
End: 2024-04-24
Payer: COMMERCIAL

## 2024-04-24 DIAGNOSIS — H52.203 MYOPIA WITH ASTIGMATISM, BILATERAL: Primary | ICD-10-CM

## 2024-04-24 DIAGNOSIS — H52.13 MYOPIA WITH ASTIGMATISM, BILATERAL: Primary | ICD-10-CM

## 2024-04-24 PROCEDURE — 1160F RVW MEDS BY RX/DR IN RCRD: CPT | Mod: CPTII,,, | Performed by: OPTOMETRIST

## 2024-04-24 PROCEDURE — 92499 UNLISTED OPH SVC/PROCEDURE: CPT | Mod: CSM,S$GLB,, | Performed by: OPTOMETRIST

## 2024-04-24 PROCEDURE — 1159F MED LIST DOCD IN RCRD: CPT | Mod: CPTII,,, | Performed by: OPTOMETRIST

## 2024-04-24 NOTE — PROGRESS NOTES
HPI    DLS: 1/2/2024 - Dr. Ta     Pt is here today for a cl f/u.   Last edited by Claire Tinoco MA on 4/24/2024 11:16 AM.            Assessment /Plan     For exam results, see Encounter Report.    Myopia with astigmatism, bilateral      Normal ocular health.   Good VA and fit. Wear and care reviewed. RTC 1 year

## 2024-05-05 ENCOUNTER — PATIENT MESSAGE (OUTPATIENT)
Dept: OPTOMETRY | Facility: CLINIC | Age: 39
End: 2024-05-05
Payer: COMMERCIAL

## 2024-08-16 ENCOUNTER — TELEPHONE (OUTPATIENT)
Dept: OBSTETRICS AND GYNECOLOGY | Facility: CLINIC | Age: 39
End: 2024-08-16
Payer: COMMERCIAL

## 2024-08-16 NOTE — TELEPHONE ENCOUNTER
Attempted to contact pt via telephone. No answer,LVM.    Pt states no falls or changes to medication or insurance. Discussed d/c plans.

## 2024-08-16 NOTE — TELEPHONE ENCOUNTER
----- Message from Percy Sanchez LPN sent at 8/16/2024  3:22 PM CDT -----  Regarding: orders for mammogram  Patient is scheduled for a screening mammogram on Monday 8/19. Patient called and states she has a new lump in her breast. Due to the patient having a problem we are not able to do her as a routine screening mammogram,  if possible can new orders be entered for the patient - Bilateral diagnostic mammogram.    Thank you,

## 2024-08-16 NOTE — TELEPHONE ENCOUNTER
----- Message from Mitch Murphy sent at 8/16/2024  4:13 PM CDT -----      Name of Who is Calling: DEON HIDALGO [[id number]]      What is the request in detail: pt returned call to the office.Please contact to further discuss and advise.          Can the clinic reply by [facility]: Y      [facility]  [phone]

## 2024-08-20 ENCOUNTER — OFFICE VISIT (OUTPATIENT)
Dept: OBSTETRICS AND GYNECOLOGY | Facility: CLINIC | Age: 39
End: 2024-08-20
Payer: COMMERCIAL

## 2024-08-20 VITALS
DIASTOLIC BLOOD PRESSURE: 70 MMHG | WEIGHT: 112.44 LBS | HEIGHT: 63 IN | BODY MASS INDEX: 19.92 KG/M2 | SYSTOLIC BLOOD PRESSURE: 102 MMHG

## 2024-08-20 DIAGNOSIS — N63.11 MASS OF UPPER OUTER QUADRANT OF RIGHT BREAST: Primary | ICD-10-CM

## 2024-08-20 DIAGNOSIS — Z32.02 NEGATIVE PREGNANCY TEST: ICD-10-CM

## 2024-08-20 LAB
B-HCG UR QL: NEGATIVE
CTP QC/QA: YES

## 2024-08-20 PROCEDURE — 1159F MED LIST DOCD IN RCRD: CPT | Mod: CPTII,S$GLB,, | Performed by: FAMILY MEDICINE

## 2024-08-20 PROCEDURE — 99213 OFFICE O/P EST LOW 20 MIN: CPT | Mod: S$GLB,,, | Performed by: FAMILY MEDICINE

## 2024-08-20 PROCEDURE — 81025 URINE PREGNANCY TEST: CPT | Mod: S$GLB,,, | Performed by: FAMILY MEDICINE

## 2024-08-20 PROCEDURE — 3008F BODY MASS INDEX DOCD: CPT | Mod: CPTII,S$GLB,, | Performed by: FAMILY MEDICINE

## 2024-08-20 PROCEDURE — 99999 PR PBB SHADOW E&M-EST. PATIENT-LVL III: CPT | Mod: PBBFAC,,, | Performed by: FAMILY MEDICINE

## 2024-08-20 PROCEDURE — 3074F SYST BP LT 130 MM HG: CPT | Mod: CPTII,S$GLB,, | Performed by: FAMILY MEDICINE

## 2024-08-20 PROCEDURE — 1160F RVW MEDS BY RX/DR IN RCRD: CPT | Mod: CPTII,S$GLB,, | Performed by: FAMILY MEDICINE

## 2024-08-20 PROCEDURE — 3078F DIAST BP <80 MM HG: CPT | Mod: CPTII,S$GLB,, | Performed by: FAMILY MEDICINE

## 2024-08-20 NOTE — PROGRESS NOTES
"  Chief Complaints: Breast lump    HPI:  39 y.o. F  presents today complaining of a breast lump on the right breast. She noticed the lump 14 days ago and it is painful to touch. Lump has since resolved and pain improving but still there. Felt cramping like her cycle was coming on but usually amenorrheic from ocp. Pain/mass was upper outer quad. Also lower aspect of the breast int painful for 5-6 months. Hx of implants, 13 yrs old. +family hx of breast ca. No nipple discharge, trauma, skin changes. No history of breast masses. Denies f/c/n/v. Mammo 2023 NL TC 18%. This is the extent of the patient's complaints at this time.     /70   Ht 5' 3" (1.6 m)   Wt 51 kg (112 lb 7 oz)   LMP  (LMP Unknown)   BMI 19.92 kg/m²     Past Medical History:   Diagnosis Date    Abnormal Pap smear     Allergy     Anxiety     GERD (gastroesophageal reflux disease)     Hemorrhoids without complication     Supraventricular tachycardia        Past Surgical History:   Procedure Laterality Date    AUGMENTATION OF BREAST  2007    CERVICAL BIOPSY  W/ LOOP ELECTRODE EXCISION      VAGINAL DELIVERY         Family History   Problem Relation Name Age of Onset    Breast cancer Paternal Grandmother      Skin cancer Father      Arrhythmia Mother      Breast cancer Paternal Aunt      Breast cancer Paternal Aunt      Cancer Neg Hx      Colon cancer Neg Hx      Diabetes Neg Hx      Eclampsia Neg Hx      Hypertension Neg Hx      Miscarriages / Stillbirths Neg Hx      Ovarian cancer Neg Hx       labor Neg Hx      Stroke Neg Hx         Social History     Socioeconomic History    Marital status:    Occupational History     Employer: [employer]   Tobacco Use    Smoking status: Former     Current packs/day: 0.50     Average packs/day: 0.5 packs/day for 6.0 years (3.0 ttl pk-yrs)     Types: Cigarettes    Smokeless tobacco: Never   Substance and Sexual Activity    Alcohol use: Yes     Alcohol/week: 2.0 standard drinks " of alcohol     Types: 2 Glasses of wine per week    Drug use: No    Sexual activity: Yes     Partners: Male     Birth control/protection: OCP     Comment: ocps       Current Outpatient Medications   Medication Sig Dispense Refill    dextroamphetamine-amphetamine (ADDERALL) 20 mg tablet Take 1 tablet by mouth 3 (three) times daily.      dextroamphetamine-amphetamine 30 mg Tab Take 1 tablet by mouth 2 (two) times daily.      noreth-ethinyl estradioL-iron (LAYOLIS FE) 0.8mg-25mcg(24) and 75 mg (4) Chew Take 1 tablet by mouth once daily. Last refill before annual. Please keep appt for further refills 84 tablet 3    propranoloL (INDERAL) 10 MG tablet Take 10 mg by mouth as needed.       No current facility-administered medications for this visit.       Review of patient's allergies indicates:   Allergen Reactions    Vicodin [hydrocodone-acetaminophen]      ITCHING  Okay to take percocet and acetaminophen          OB History    Para Term  AB Living   3 2 2   1 2   SAB IAB Ectopic Multiple Live Births   1     0 2      # Outcome Date GA Lbr Timothy/2nd Weight Sex Type Anes PTL Lv   3 Term 10/29/20 37w0d  1.931 kg (4 lb 4.1 oz) F Vag-Spont EPI N QUE   2 SAB 20        FD   1 Term 08    M Vag-Spont   QUE        ROS:  GENERAL: No abnormal weight loss or gain Feeling well overall.   SKIN: Denies rash or lesions to breast  NODES: Denies enlarged lymph nodes.   BREASTS: +pain, mass resolved. No skin changes or nipple discharge  MUSCULOSKELETAL: Denies trauma     Physical Exam:  Physical Exam:   Constitutional: She appears well-developed and well-nourished. She does not appear ill. No distress.        Pulmonary/Chest: Right breast exhibits tenderness. Right breast exhibits no inverted nipple, no mass, no nipple discharge, no skin change, no bleeding and no swelling. Left breast exhibits no inverted nipple, no mass, no nipple discharge, no skin change, no tenderness, no bleeding and no swelling. Breasts are  symmetrical.   Right: pain 10 oclock position 9cm from nipple, had mass there that resolved; pain 6oclock position 6cm from nipple                          Neurological: GCS eye subscore is 4. GCS verbal subscore is 5. GCS motor subscore is 6.        Results for orders placed or performed in visit on 08/20/24   POCT urine pregnancy   Result Value Ref Range    POC Preg Test, Ur Negative Negative     Acceptable Yes        Assessment/Plan:    Mass of upper outer quadrant of right breast  -     Mammo Digital Diagnostic Bilat with [facility]; Future; Expected date: 08/20/2024  -     US Breast Right Limited; Future; Expected date: 08/20/2024    Negative pregnancy test  -     POCT urine pregnancy    Notify me if sx worsening before appt. Ibuprofen/well fitting bra/avoid caffeine

## 2024-08-22 ENCOUNTER — TELEPHONE (OUTPATIENT)
Dept: RADIOLOGY | Facility: HOSPITAL | Age: 39
End: 2024-08-22
Payer: COMMERCIAL

## 2024-08-22 NOTE — TELEPHONE ENCOUNTER
"----- Message from Vivi Natarajan sent at 8/21/2024  4:17 PM CDT -----  Regarding: pt adivce  Contact: [phone]  .Name Of Caller: Self     Contact Preference?:[phone]     What is the nature of the call?:pt wanting to speak to Percy. Pls call      Additional Notes:  "Thank you for all that you do for our patients"  "

## 2024-09-13 ENCOUNTER — TELEPHONE (OUTPATIENT)
Dept: OPTOMETRY | Facility: CLINIC | Age: 39
End: 2024-09-13
Payer: COMMERCIAL

## 2024-09-13 NOTE — TELEPHONE ENCOUNTER
----- Message from GLADYS Urban sent at 9/12/2024  4:36 PM CDT -----  Regarding: FW: Consult/Advisory  Contact: r:Cass Shipley    ----- Message -----  From: Trish Miles  Sent: 9/12/2024   4:33 PM CDT  To: [facility] Staff  Subject: Consult/Advisory                                    Consult/Advisory     Name Of Caller:Cass Shipley         Contact Preference:[phone] (home)       Nature of call:Patient is calling to come by and  a copy of her script. Requesting a call back

## 2025-01-31 ENCOUNTER — PATIENT MESSAGE (OUTPATIENT)
Dept: OBSTETRICS AND GYNECOLOGY | Facility: CLINIC | Age: 40
End: 2025-01-31
Payer: COMMERCIAL

## 2025-01-31 DIAGNOSIS — Z30.9 ENCOUNTER FOR CONTRACEPTIVE MANAGEMENT, UNSPECIFIED TYPE: ICD-10-CM

## 2025-01-31 RX ORDER — NORETHINDRONE, ETHINYL ESTRADIOL, AND FERROUS FUMARATE 0.8-25(24)
1 KIT ORAL
Qty: 90 TABLET | Refills: 0 | Status: SHIPPED | OUTPATIENT
Start: 2025-01-31

## 2025-03-20 ENCOUNTER — HOSPITAL ENCOUNTER (OUTPATIENT)
Dept: RADIOLOGY | Facility: HOSPITAL | Age: 40
Discharge: HOME | End: 2025-03-20
Attending: FAMILY MEDICINE
Payer: COMMERCIAL

## 2025-03-20 ENCOUNTER — OFFICE VISIT (OUTPATIENT)
Dept: OBSTETRICS AND GYNECOLOGY | Facility: CLINIC | Age: 40
End: 2025-03-20
Payer: COMMERCIAL

## 2025-03-20 VITALS — WEIGHT: 125 LBS | BODY MASS INDEX: 22.14 KG/M2

## 2025-03-20 VITALS
SYSTOLIC BLOOD PRESSURE: 104 MMHG | DIASTOLIC BLOOD PRESSURE: 74 MMHG | BODY MASS INDEX: 22.38 KG/M2 | HEIGHT: 63 IN | WEIGHT: 126.31 LBS

## 2025-03-20 DIAGNOSIS — R10.2 PELVIC PAIN: ICD-10-CM

## 2025-03-20 DIAGNOSIS — Z01.419 ENCOUNTER FOR WELL WOMAN EXAM: Primary | ICD-10-CM

## 2025-03-20 DIAGNOSIS — Z12.31 BREAST CANCER SCREENING BY MAMMOGRAM: ICD-10-CM

## 2025-03-20 DIAGNOSIS — Z30.9 ENCOUNTER FOR CONTRACEPTIVE MANAGEMENT, UNSPECIFIED TYPE: ICD-10-CM

## 2025-03-20 LAB
B-HCG UR QL: NEGATIVE
CTP QC/QA: YES

## 2025-03-20 PROCEDURE — 77063 BREAST TOMOSYNTHESIS BI: CPT | Mod: TC

## 2025-03-20 PROCEDURE — 76830 TRANSVAGINAL US NON-OB: CPT | Mod: 26,,, | Performed by: INTERNAL MEDICINE

## 2025-03-20 PROCEDURE — 76856 US EXAM PELVIC COMPLETE: CPT | Mod: TC

## 2025-03-20 PROCEDURE — 77067 SCR MAMMO BI INCL CAD: CPT | Mod: 26,,, | Performed by: RADIOLOGY

## 2025-03-20 PROCEDURE — 77063 BREAST TOMOSYNTHESIS BI: CPT | Mod: 26,,, | Performed by: RADIOLOGY

## 2025-03-20 PROCEDURE — 76856 US EXAM PELVIC COMPLETE: CPT | Mod: 26,,, | Performed by: INTERNAL MEDICINE

## 2025-03-20 PROCEDURE — 99999 PR PBB SHADOW E&M-EST. PATIENT-LVL III: CPT | Mod: PBBFAC,,, | Performed by: FAMILY MEDICINE

## 2025-03-20 RX ORDER — NORETHINDRONE AND ETHINYL ESTRADIOL AND FERROUS FUMARATE 0.8-25(24)
1 KIT ORAL DAILY
Qty: 90 TABLET | Refills: 3 | Status: SHIPPED | OUTPATIENT
Start: 2025-03-20

## 2025-03-20 RX ORDER — TRETINOIN 0.5 MG/G
CREAM TOPICAL NIGHTLY
COMMUNITY
Start: 2025-02-27

## 2025-03-20 NOTE — PROGRESS NOTES
HISTORY OF PRESENT ILLNESS:    Cass Shipley is a 39 y.o. female, , No LMP recorded (lmp unknown). (Menstrual status: Birth Control).,  presents for a routine annual exam .   Last pap:   NL   Last mammogram:  NL TC 18%- doing screening mammogram today  Last colon ca screening:  na   SA: male partner, does not use condoms  Contraception: OCP (estrogen/progesterone).    Sexually transmitted infection risk: very low risk of STD exposure.   Menstrual flow: amenorrheic on ocp  -feels in the past few months perimenopause sx, hot flashes/hair loss/insomnia/weight gain  -no breast pain uti sx abnormal vd.  This is the extent of the patient's complaints at this time.     Past Medical History:   Diagnosis Date    Abnormal Pap smear     Allergy     Anxiety     GERD (gastroesophageal reflux disease)     Hemorrhoids without complication     Supraventricular tachycardia        Past Surgical History:   Procedure Laterality Date    AUGMENTATION OF BREAST  2007    CERVICAL BIOPSY  W/ LOOP ELECTRODE EXCISION      VAGINAL DELIVERY         MEDICATIONS AND ALLERGIES:    Current Medications[1]    Review of patient's allergies indicates:   Allergen Reactions    Vicodin [hydrocodone-acetaminophen]      ITCHING  Okay to take percocet and acetaminophen       Family History   Problem Relation Name Age of Onset    Breast cancer Paternal Grandmother      Skin cancer Father      Arrhythmia Mother      Breast cancer Paternal Aunt      Breast cancer Paternal Aunt      Cancer Neg Hx      Colon cancer Neg Hx      Diabetes Neg Hx      Eclampsia Neg Hx      Hypertension Neg Hx      Miscarriages / Stillbirths Neg Hx      Ovarian cancer Neg Hx       labor Neg Hx      Stroke Neg Hx         Social History[2]    OB History    Para Term  AB Living   3 2 2  1 2   SAB IAB Ectopic Multiple Live Births   1   0 2      # Outcome Date GA Lbr Timothy/2nd Weight Sex Type Anes PTL Lv   3 Term 10/29/20 37w0d  1.931 kg (4 lb 4.1  "oz) F Vag-Spont EPI N QUE   2 SAB 01/01/20        FD   1 Term 07/28/08    M Vag-Spont   QUE          COMPREHENSIVE GYN HISTORY:  PAP History: + abnormal Paps.  Infection History: Denies STDs. Denies PID.  Benign History: Denies uterine fibroids. Denies ovarian cysts. Denies endometriosis. Denies other conditions.  Cancer History: Denies cervical cancer. Denies uterine cancer or hyperplasia. Denies ovarian cancer. Denies vulvar cancer or pre-cancer. Denies vaginal cancer or pre-cancer. Denies breast cancer. Denies colon cancer.      ROS:  GENERAL: No weight changes. No swelling. No fatigue. No fever. +hot flashes +hair loss +insomnia  BREASTS: No pain. No lumps. No discharge.  ABDOMEN: No pain. No nausea. No vomiting. No diarrhea. No constipation.  REPRODUCTIVE: No abnormal bleeding. No pelvic pain.   VULVA: No pain. No lesions. No itching.  VAGINA: No relaxation. No itching. No odor. No discharge. No lesions.  URINARY: No incontinence. No nocturia. No frequency. No dysuria.    /74 (Patient Position: Sitting)   Ht 5' 3" (1.6 m)   Wt 57.3 kg (126 lb 5.2 oz)   LMP  (LMP Unknown)   BMI 22.38 kg/m²     PE:  Physical Exam:   Constitutional: She is oriented to person, place, and time. She appears well-developed and well-nourished. No distress.      Neck: No thyroid mass and no thyromegaly present.     Pulmonary/Chest: Right breast exhibits no inverted nipple, no mass, no nipple discharge, no skin change, no tenderness and no bleeding. Left breast exhibits no inverted nipple, no mass, no nipple discharge, no skin change, no tenderness and no bleeding.        Abdominal: Soft. There is no abdominal tenderness.     Genitourinary:    Vagina, uterus and left adnexa normal.      Pelvic exam was performed with patient in the lithotomy position.   The external female genitalia was normal.   Genitalia hair distrobution normal .   There is no rash or lesion on the right labia. There is no rash or lesion on the left labia. " Cervix is normal. Right adnexum displays tenderness and fullness. Right adnexum displays no mass. Left adnexum displays no mass, no tenderness and no fullness. No vaginal discharge, tenderness, bleeding, rectocele, cystocele or prolapse of vaginal walls in the vagina.    No foreign body in the vagina.   Cervix exhibits no motion tenderness, no lesion, no discharge, no friability, no tenderness and no polyp.    pap smear not completedUterus is not enlarged and not tender. Normal urethral meatus.              Neurological: She is alert and oriented to person, place, and time.          PROCEDURES/ORDERS:  Pap      Assessment/Plan:    Encounter for well woman exam    Pelvic pain  -     US Pelvis Comp with Transvag NON-OB (xpd; Future; Expected date: 03/20/2025  -     POCT Urine Pregnancy    Encounter for contraceptive management, unspecified type  -     noreth-ethinyl estradioL-iron (LAYOLIS FE) 0.8mg-25mcg(24) and 75 mg (4) Chew; Take 1 tablet by mouth once daily.  Dispense: 90 tablet; Refill: 3    Pt hesitant to try higher dose pill as she likes the pill she is currently on, will monitor sx and let me know if anything is worsening    COUNSELING:  The patient was counseled today on:  -A.C.S. Pap and pelvic exam guidelines, recomendations for yearly mammogram, monthly self breast exams and to follow up with her PCP for other health maintenance.    FOLLOW-UP  annually for [facility].        [1]   Current Outpatient Medications:     dextroamphetamine-amphetamine (ADDERALL) 20 mg tablet, Take 1 tablet by mouth 3 (three) times daily., Disp: , Rfl:     dextroamphetamine-amphetamine 30 mg Tab, Take 1 tablet by mouth 2 (two) times daily., Disp: , Rfl:     propranoloL (INDERAL) 10 MG tablet, Take 10 mg by mouth as needed., Disp: , Rfl:     tretinoin (RETIN-A) 0.05 % cream, Apply topically every evening., Disp: , Rfl:     noreth-ethinyl estradioL-iron (LAYOLIS FE) 0.8mg-25mcg(24) and 75 mg (4) Chew, Take 1 tablet by mouth once daily.,  Disp: 90 tablet, Rfl: 3  [2]   Social History  Socioeconomic History    Marital status:    Occupational History     Employer: [employer]   Tobacco Use    Smoking status: Former     Current packs/day: 0.50     Average packs/day: 0.5 packs/day for 6.0 years (3.0 ttl pk-yrs)     Types: Cigarettes    Smokeless tobacco: Never   Substance and Sexual Activity    Alcohol use: Yes     Alcohol/week: 2.0 standard drinks of alcohol     Types: 2 Glasses of wine per week    Drug use: No    Sexual activity: Yes     Partners: Male     Birth control/protection: OCP     Comment: ocps

## 2025-03-21 ENCOUNTER — RESULTS FOLLOW-UP (OUTPATIENT)
Dept: OBSTETRICS AND GYNECOLOGY | Facility: CLINIC | Age: 40
End: 2025-03-21

## 2025-03-24 ENCOUNTER — RESULTS FOLLOW-UP (OUTPATIENT)
Dept: OBSTETRICS AND GYNECOLOGY | Facility: CLINIC | Age: 40
End: 2025-03-24

## 2025-03-24 DIAGNOSIS — Z91.89 AT INCREASED RISK OF BREAST CANCER: Primary | ICD-10-CM

## 2025-05-11 ENCOUNTER — PATIENT MESSAGE (OUTPATIENT)
Dept: OPTOMETRY | Facility: CLINIC | Age: 40
End: 2025-05-11
Payer: COMMERCIAL

## 2025-07-18 ENCOUNTER — TELEPHONE (OUTPATIENT)
Dept: OPTOMETRY | Facility: CLINIC | Age: 40
End: 2025-07-18
Payer: COMMERCIAL

## 2025-07-18 DIAGNOSIS — H57.89 EYE IRRITATION: Primary | ICD-10-CM

## 2025-07-18 RX ORDER — ERYTHROMYCIN 5 MG/G
OINTMENT OPHTHALMIC
Qty: 3.5 G | Refills: 0 | Status: SHIPPED | OUTPATIENT
Start: 2025-07-18

## 2025-09-05 DIAGNOSIS — N92.1 BREAKTHROUGH BLEEDING ON OCPS: Primary | ICD-10-CM

## 2025-09-05 DIAGNOSIS — Z30.9 ENCOUNTER FOR CONTRACEPTIVE MANAGEMENT, UNSPECIFIED TYPE: ICD-10-CM

## 2025-09-05 RX ORDER — NORETHINDRONE, ETHINYL ESTRADIOL, AND FERROUS FUMARATE 0.8-25(24)
1 KIT ORAL DAILY
Qty: 90 TABLET | Refills: 2 | Status: SHIPPED | OUTPATIENT
Start: 2025-09-05